# Patient Record
Sex: MALE | Race: WHITE | HISPANIC OR LATINO | URBAN - METROPOLITAN AREA
[De-identification: names, ages, dates, MRNs, and addresses within clinical notes are randomized per-mention and may not be internally consistent; named-entity substitution may affect disease eponyms.]

---

## 2018-12-26 ENCOUNTER — EMERGENCY (EMERGENCY)
Facility: HOSPITAL | Age: 23
LOS: 1 days | Discharge: HOME | End: 2018-12-26
Attending: EMERGENCY MEDICINE
Payer: SELF-PAY

## 2018-12-26 VITALS
SYSTOLIC BLOOD PRESSURE: 146 MMHG | TEMPERATURE: 98 F | DIASTOLIC BLOOD PRESSURE: 81 MMHG | HEART RATE: 109 BPM | RESPIRATION RATE: 18 BRPM | OXYGEN SATURATION: 97 %

## 2018-12-26 VITALS — WEIGHT: 199.96 LBS | HEIGHT: 64 IN

## 2018-12-26 PROCEDURE — 99283 EMERGENCY DEPT VISIT LOW MDM: CPT

## 2018-12-26 RX ORDER — OFLOXACIN 0.3 %
1 DROPS OPHTHALMIC (EYE) ONCE
Qty: 0 | Refills: 0 | Status: DISCONTINUED | OUTPATIENT
Start: 2018-12-26 | End: 2018-12-31

## 2018-12-26 RX ORDER — PROPARACAINE HYDROCHLORIDE 5 MG/ML
1 SOLUTION/ DROPS OPHTHALMIC ONCE
Qty: 0 | Refills: 0 | Status: DISCONTINUED | OUTPATIENT
Start: 2018-12-26 | End: 2018-12-26
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
## 2018-12-26 NOTE — ED STATDOCS - ATTENDING CONTRIBUTION TO CARE
I, Shaniqua Norton, performed the initial face to face bedside interview with this patient regarding history of present illness, review of symptoms and relevant past medical, social and family history.  I completed an independent physical examination.  I was the initial provider who evaluated this patient. I have signed out the follow up of any pending tests (i.e. labs, radiological studies) to the ACP.  I have communicated the patient’s plan of care and disposition with the ACP.
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
## 2020-09-29 ENCOUNTER — INPATIENT (INPATIENT)
Facility: HOSPITAL | Age: 25
LOS: 2 days | Discharge: HOME | DRG: 638 | End: 2020-10-02
Attending: STUDENT IN AN ORGANIZED HEALTH CARE EDUCATION/TRAINING PROGRAM | Admitting: STUDENT IN AN ORGANIZED HEALTH CARE EDUCATION/TRAINING PROGRAM
Payer: COMMERCIAL

## 2020-09-29 VITALS
DIASTOLIC BLOOD PRESSURE: 60 MMHG | RESPIRATION RATE: 20 BRPM | SYSTOLIC BLOOD PRESSURE: 105 MMHG | HEART RATE: 118 BPM | HEIGHT: 64 IN | OXYGEN SATURATION: 98 % | TEMPERATURE: 100 F

## 2020-09-29 DIAGNOSIS — E11.10 TYPE 2 DIABETES MELLITUS WITH KETOACIDOSIS WITHOUT COMA: ICD-10-CM

## 2020-09-29 DIAGNOSIS — Z90.49 ACQUIRED ABSENCE OF OTHER SPECIFIED PARTS OF DIGESTIVE TRACT: Chronic | ICD-10-CM

## 2020-09-29 LAB
A1C WITH ESTIMATED AVERAGE GLUCOSE RESULT: 10.7 % — HIGH (ref 4–5.6)
ALBUMIN SERPL ELPH-MCNC: 4.4 G/DL — SIGNIFICANT CHANGE UP (ref 3.3–5.2)
ALP SERPL-CCNC: 132 U/L — HIGH (ref 40–120)
ALT FLD-CCNC: 90 U/L — HIGH
ANION GAP SERPL CALC-SCNC: 21 MMOL/L — HIGH (ref 5–17)
ANION GAP SERPL CALC-SCNC: 23 MMOL/L — HIGH (ref 5–17)
APPEARANCE UR: CLEAR — SIGNIFICANT CHANGE UP
APTT BLD: 31.2 SEC — SIGNIFICANT CHANGE UP (ref 27.5–35.5)
AST SERPL-CCNC: 54 U/L — HIGH
BACTERIA # UR AUTO: ABNORMAL
BASE EXCESS BLDV CALC-SCNC: -0.3 MMOL/L — SIGNIFICANT CHANGE UP (ref -2–2)
BASOPHILS # BLD AUTO: 0.05 K/UL — SIGNIFICANT CHANGE UP (ref 0–0.2)
BASOPHILS NFR BLD AUTO: 0.7 % — SIGNIFICANT CHANGE UP (ref 0–2)
BILIRUB SERPL-MCNC: 0.5 MG/DL — SIGNIFICANT CHANGE UP (ref 0.4–2)
BILIRUB UR-MCNC: NEGATIVE — SIGNIFICANT CHANGE UP
BLD GP AB SCN SERPL QL: SIGNIFICANT CHANGE UP
BUN SERPL-MCNC: 10 MG/DL — SIGNIFICANT CHANGE UP (ref 8–20)
BUN SERPL-MCNC: 12 MG/DL — SIGNIFICANT CHANGE UP (ref 8–20)
CA-I SERPL-SCNC: 1.15 MMOL/L — SIGNIFICANT CHANGE UP (ref 1.15–1.33)
CALCIUM SERPL-MCNC: 9.5 MG/DL — SIGNIFICANT CHANGE UP (ref 8.6–10.2)
CALCIUM SERPL-MCNC: 9.7 MG/DL — SIGNIFICANT CHANGE UP (ref 8.6–10.2)
CHLORIDE BLDV-SCNC: 104 MMOL/L — SIGNIFICANT CHANGE UP (ref 98–107)
CHLORIDE SERPL-SCNC: 100 MMOL/L — SIGNIFICANT CHANGE UP (ref 98–107)
CHLORIDE SERPL-SCNC: 90 MMOL/L — LOW (ref 98–107)
CO2 SERPL-SCNC: 19 MMOL/L — LOW (ref 22–29)
CO2 SERPL-SCNC: 20 MMOL/L — LOW (ref 22–29)
COLOR SPEC: YELLOW — SIGNIFICANT CHANGE UP
CREAT SERPL-MCNC: 0.83 MG/DL — SIGNIFICANT CHANGE UP (ref 0.5–1.3)
CREAT SERPL-MCNC: 0.9 MG/DL — SIGNIFICANT CHANGE UP (ref 0.5–1.3)
DIFF PNL FLD: NEGATIVE — SIGNIFICANT CHANGE UP
EOSINOPHIL # BLD AUTO: 0.04 K/UL — SIGNIFICANT CHANGE UP (ref 0–0.5)
EOSINOPHIL NFR BLD AUTO: 0.6 % — SIGNIFICANT CHANGE UP (ref 0–6)
EPI CELLS # UR: SIGNIFICANT CHANGE UP
ESTIMATED AVERAGE GLUCOSE: 260 MG/DL — HIGH (ref 68–114)
GAS PNL BLDV: 141 MMOL/L — SIGNIFICANT CHANGE UP (ref 135–145)
GAS PNL BLDV: SIGNIFICANT CHANGE UP
GAS PNL BLDV: SIGNIFICANT CHANGE UP
GLUCOSE BLDC GLUCOMTR-MCNC: 287 MG/DL — HIGH (ref 70–99)
GLUCOSE BLDV-MCNC: 336 MG/DL — HIGH (ref 70–99)
GLUCOSE SERPL-MCNC: 355 MG/DL — HIGH (ref 70–99)
GLUCOSE SERPL-MCNC: 632 MG/DL — CRITICAL HIGH (ref 70–99)
GLUCOSE UR QL: 1000 MG/DL
HCO3 BLDV-SCNC: 24 MMOL/L — SIGNIFICANT CHANGE UP (ref 21–29)
HCT VFR BLD CALC: 48.8 % — SIGNIFICANT CHANGE UP (ref 39–50)
HCT VFR BLDA CALC: 50 — SIGNIFICANT CHANGE UP (ref 39–50)
HGB BLD CALC-MCNC: 16.3 G/DL — SIGNIFICANT CHANGE UP (ref 13–17)
HGB BLD-MCNC: 16.9 G/DL — SIGNIFICANT CHANGE UP (ref 13–17)
IMM GRANULOCYTES NFR BLD AUTO: 0.4 % — SIGNIFICANT CHANGE UP (ref 0–1.5)
INR BLD: 1 RATIO — SIGNIFICANT CHANGE UP (ref 0.88–1.16)
KETONES UR-MCNC: ABNORMAL
LACTATE BLDV-MCNC: 1.3 MMOL/L — SIGNIFICANT CHANGE UP (ref 0.5–2)
LEUKOCYTE ESTERASE UR-ACNC: NEGATIVE — SIGNIFICANT CHANGE UP
LIDOCAIN IGE QN: 35 U/L — SIGNIFICANT CHANGE UP (ref 22–51)
LYMPHOCYTES # BLD AUTO: 2.67 K/UL — SIGNIFICANT CHANGE UP (ref 1–3.3)
LYMPHOCYTES # BLD AUTO: 37.2 % — SIGNIFICANT CHANGE UP (ref 13–44)
MCHC RBC-ENTMCNC: 28.7 PG — SIGNIFICANT CHANGE UP (ref 27–34)
MCHC RBC-ENTMCNC: 34.6 GM/DL — SIGNIFICANT CHANGE UP (ref 32–36)
MCV RBC AUTO: 83 FL — SIGNIFICANT CHANGE UP (ref 80–100)
MONOCYTES # BLD AUTO: 0.44 K/UL — SIGNIFICANT CHANGE UP (ref 0–0.9)
MONOCYTES NFR BLD AUTO: 6.1 % — SIGNIFICANT CHANGE UP (ref 2–14)
NEUTROPHILS # BLD AUTO: 3.95 K/UL — SIGNIFICANT CHANGE UP (ref 1.8–7.4)
NEUTROPHILS NFR BLD AUTO: 55 % — SIGNIFICANT CHANGE UP (ref 43–77)
NITRITE UR-MCNC: NEGATIVE — SIGNIFICANT CHANGE UP
OTHER CELLS CSF MANUAL: 22 ML/DL — SIGNIFICANT CHANGE UP (ref 18–22)
PCO2 BLDV: 43 MMHG — SIGNIFICANT CHANGE UP (ref 35–50)
PH BLDV: 7.37 — SIGNIFICANT CHANGE UP (ref 7.32–7.43)
PH UR: 5 — SIGNIFICANT CHANGE UP (ref 5–8)
PLATELET # BLD AUTO: 255 K/UL — SIGNIFICANT CHANGE UP (ref 150–400)
PO2 BLDV: 87 MMHG — HIGH (ref 25–45)
POTASSIUM BLDV-SCNC: 3.7 MMOL/L — SIGNIFICANT CHANGE UP (ref 3.4–4.5)
POTASSIUM SERPL-MCNC: 3.7 MMOL/L — SIGNIFICANT CHANGE UP (ref 3.5–5.3)
POTASSIUM SERPL-MCNC: 3.8 MMOL/L — SIGNIFICANT CHANGE UP (ref 3.5–5.3)
POTASSIUM SERPL-SCNC: 3.7 MMOL/L — SIGNIFICANT CHANGE UP (ref 3.5–5.3)
POTASSIUM SERPL-SCNC: 3.8 MMOL/L — SIGNIFICANT CHANGE UP (ref 3.5–5.3)
PROT SERPL-MCNC: 8 G/DL — SIGNIFICANT CHANGE UP (ref 6.6–8.7)
PROT UR-MCNC: 15 MG/DL
PROTHROM AB SERPL-ACNC: 11.6 SEC — SIGNIFICANT CHANGE UP (ref 10.6–13.6)
RBC # BLD: 5.88 M/UL — HIGH (ref 4.2–5.8)
RBC # FLD: 11.8 % — SIGNIFICANT CHANGE UP (ref 10.3–14.5)
RBC CASTS # UR COMP ASSIST: NEGATIVE /HPF — SIGNIFICANT CHANGE UP (ref 0–4)
SAO2 % BLDV: 98 % — SIGNIFICANT CHANGE UP
SARS-COV-2 RNA SPEC QL NAA+PROBE: SIGNIFICANT CHANGE UP
SODIUM SERPL-SCNC: 133 MMOL/L — LOW (ref 135–145)
SODIUM SERPL-SCNC: 140 MMOL/L — SIGNIFICANT CHANGE UP (ref 135–145)
SP GR SPEC: 1.01 — SIGNIFICANT CHANGE UP (ref 1.01–1.02)
TSH SERPL-MCNC: 2.19 UIU/ML — SIGNIFICANT CHANGE UP (ref 0.27–4.2)
UROBILINOGEN FLD QL: NEGATIVE MG/DL — SIGNIFICANT CHANGE UP
WBC # BLD: 7.18 K/UL — SIGNIFICANT CHANGE UP (ref 3.8–10.5)
WBC # FLD AUTO: 7.18 K/UL — SIGNIFICANT CHANGE UP (ref 3.8–10.5)
WBC UR QL: SIGNIFICANT CHANGE UP

## 2020-09-29 PROCEDURE — 99291 CRITICAL CARE FIRST HOUR: CPT

## 2020-09-29 PROCEDURE — 99223 1ST HOSP IP/OBS HIGH 75: CPT

## 2020-09-29 RX ORDER — SODIUM CHLORIDE 9 MG/ML
1000 INJECTION, SOLUTION INTRAVENOUS ONCE
Refills: 0 | Status: COMPLETED | OUTPATIENT
Start: 2020-09-29 | End: 2020-09-29

## 2020-09-29 RX ORDER — INSULIN LISPRO 100/ML
10 VIAL (ML) SUBCUTANEOUS ONCE
Refills: 0 | Status: COMPLETED | OUTPATIENT
Start: 2020-09-29 | End: 2020-09-29

## 2020-09-29 RX ORDER — GLUCAGON INJECTION, SOLUTION 0.5 MG/.1ML
1 INJECTION, SOLUTION SUBCUTANEOUS ONCE
Refills: 0 | Status: DISCONTINUED | OUTPATIENT
Start: 2020-09-29 | End: 2020-10-02

## 2020-09-29 RX ORDER — KETOROLAC TROMETHAMINE 30 MG/ML
15 SYRINGE (ML) INJECTION ONCE
Refills: 0 | Status: DISCONTINUED | OUTPATIENT
Start: 2020-09-29 | End: 2020-09-29

## 2020-09-29 RX ORDER — POTASSIUM CHLORIDE 20 MEQ
10 PACKET (EA) ORAL ONCE
Refills: 0 | Status: DISCONTINUED | OUTPATIENT
Start: 2020-09-29 | End: 2020-09-29

## 2020-09-29 RX ORDER — INFLUENZA VIRUS VACCINE 15; 15; 15; 15 UG/.5ML; UG/.5ML; UG/.5ML; UG/.5ML
0.5 SUSPENSION INTRAMUSCULAR ONCE
Refills: 0 | Status: DISCONTINUED | OUTPATIENT
Start: 2020-09-29 | End: 2020-10-02

## 2020-09-29 RX ORDER — ONDANSETRON 8 MG/1
4 TABLET, FILM COATED ORAL ONCE
Refills: 0 | Status: COMPLETED | OUTPATIENT
Start: 2020-09-29 | End: 2020-09-29

## 2020-09-29 RX ORDER — INSULIN LISPRO 100/ML
12 VIAL (ML) SUBCUTANEOUS ONCE
Refills: 0 | Status: COMPLETED | OUTPATIENT
Start: 2020-09-29 | End: 2020-09-29

## 2020-09-29 RX ORDER — ONDANSETRON 8 MG/1
4 TABLET, FILM COATED ORAL EVERY 6 HOURS
Refills: 0 | Status: DISCONTINUED | OUTPATIENT
Start: 2020-09-29 | End: 2020-10-02

## 2020-09-29 RX ORDER — SODIUM CHLORIDE 9 MG/ML
1000 INJECTION, SOLUTION INTRAVENOUS
Refills: 0 | Status: DISCONTINUED | OUTPATIENT
Start: 2020-09-29 | End: 2020-10-02

## 2020-09-29 RX ORDER — DEXTROSE 50 % IN WATER 50 %
25 SYRINGE (ML) INTRAVENOUS ONCE
Refills: 0 | Status: DISCONTINUED | OUTPATIENT
Start: 2020-09-29 | End: 2020-10-02

## 2020-09-29 RX ORDER — INSULIN GLARGINE 100 [IU]/ML
30 INJECTION, SOLUTION SUBCUTANEOUS AT BEDTIME
Refills: 0 | Status: DISCONTINUED | OUTPATIENT
Start: 2020-09-30 | End: 2020-10-01

## 2020-09-29 RX ORDER — ATORVASTATIN CALCIUM 80 MG/1
20 TABLET, FILM COATED ORAL AT BEDTIME
Refills: 0 | Status: DISCONTINUED | OUTPATIENT
Start: 2020-09-29 | End: 2020-10-02

## 2020-09-29 RX ORDER — SODIUM CHLORIDE 9 MG/ML
1000 INJECTION, SOLUTION INTRAVENOUS
Refills: 0 | Status: DISCONTINUED | OUTPATIENT
Start: 2020-09-29 | End: 2020-09-29

## 2020-09-29 RX ORDER — DEXTROSE 50 % IN WATER 50 %
15 SYRINGE (ML) INTRAVENOUS ONCE
Refills: 0 | Status: DISCONTINUED | OUTPATIENT
Start: 2020-09-29 | End: 2020-10-02

## 2020-09-29 RX ORDER — INSULIN GLARGINE 100 [IU]/ML
30 INJECTION, SOLUTION SUBCUTANEOUS ONCE
Refills: 0 | Status: COMPLETED | OUTPATIENT
Start: 2020-09-29 | End: 2020-09-29

## 2020-09-29 RX ORDER — DEXTROSE 50 % IN WATER 50 %
12.5 SYRINGE (ML) INTRAVENOUS ONCE
Refills: 0 | Status: DISCONTINUED | OUTPATIENT
Start: 2020-09-29 | End: 2020-10-02

## 2020-09-29 RX ORDER — INSULIN LISPRO 100/ML
VIAL (ML) SUBCUTANEOUS
Refills: 0 | Status: DISCONTINUED | OUTPATIENT
Start: 2020-09-29 | End: 2020-10-02

## 2020-09-29 RX ORDER — POTASSIUM CHLORIDE 20 MEQ
10 PACKET (EA) ORAL ONCE
Refills: 0 | Status: COMPLETED | OUTPATIENT
Start: 2020-09-29 | End: 2020-09-29

## 2020-09-29 RX ADMIN — SODIUM CHLORIDE 1000 MILLILITER(S): 9 INJECTION, SOLUTION INTRAVENOUS at 13:14

## 2020-09-29 RX ADMIN — Medication 10 UNIT(S): at 14:37

## 2020-09-29 RX ADMIN — ONDANSETRON 4 MILLIGRAM(S): 8 TABLET, FILM COATED ORAL at 13:16

## 2020-09-29 RX ADMIN — Medication 15 MILLIGRAM(S): at 13:14

## 2020-09-29 RX ADMIN — Medication 100 MILLIEQUIVALENT(S): at 15:32

## 2020-09-29 RX ADMIN — SODIUM CHLORIDE 1000 MILLILITER(S): 9 INJECTION, SOLUTION INTRAVENOUS at 14:16

## 2020-09-29 RX ADMIN — INSULIN GLARGINE 30 UNIT(S): 100 INJECTION, SOLUTION SUBCUTANEOUS at 15:32

## 2020-09-29 RX ADMIN — Medication 15 MILLIGRAM(S): at 15:38

## 2020-09-29 RX ADMIN — SODIUM CHLORIDE 100 MILLILITER(S): 9 INJECTION, SOLUTION INTRAVENOUS at 22:56

## 2020-09-29 RX ADMIN — SODIUM CHLORIDE 1000 MILLILITER(S): 9 INJECTION, SOLUTION INTRAVENOUS at 17:03

## 2020-09-29 RX ADMIN — Medication 12 UNIT(S): at 13:39

## 2020-09-29 RX ADMIN — SODIUM CHLORIDE 200 MILLILITER(S): 9 INJECTION, SOLUTION INTRAVENOUS at 20:15

## 2020-09-29 RX ADMIN — ATORVASTATIN CALCIUM 20 MILLIGRAM(S): 80 TABLET, FILM COATED ORAL at 22:55

## 2020-09-29 NOTE — H&P ADULT - ATTENDING COMMENTS
Pt interview and examined with his girlfriend at bedside. My addendum with physical exam as documented below.     25yoM hx obesity, HLD, strong FHx of DM being admitted for new onset DM with hyperglycemia and mild DKA.  Pt reports a 1 week hx of urinary frequency and urgency, polydipsia that eventually progressed to generalized abdominal pain associated with nausea and vomiting for the past 3 days.  Pt has a PMD and reports he was told that he was ‘pre-diabetic’ about 2 months ago and was told to check his finger sticks but says he never received the lancets and strips that he was prescribed.  On admission, initial FS >530 and serum glucose 632, AG 23, pH on VBG 7.32. UA showed moderate ketones and glucosuria.  In ED, pt received 3L of lactated ringers, Humalog 10U, Humalog 12 and lantus 30U. Repeat BMP showed improvement in glucose (385) and AGMA (21).  Pt reports resolution of GI symptoms after ED interventions.    New onset of DM with hyperglycemia and mild DKA  -Uncontrolled DM, HgbA1c 10.7, hyperglycemia, GI-related symptoms with AGMA and moderate ketonuria   -Received total of 52U insulin in ED (Humalog 10U, Humalog 12U, lantus 30U)  -As insulin naïve, will monitor FS overnight, scheduled FS timed for 9PM, 1AM, 6AM  -Based on 0.2units/kg dosing, will start lantus 30U  -Day time ISS started by tele-hospitalist, will add nocturnal ISS  -Endocrine consulted  -Explained to pt that he will likely require insulin and diabetes teaching at time of discharge     Anion gap metabolic acidosis  -Due to hyperglycemia, VBG shows normal pH  -Receiving insulin and IVF with some improvement  -Trend BMP    HLD  -On atorvastatin but doesn’t remember dose,   -Called [facility] pharmacy overnight, unable to reach pharmacist     Prophylactic measure  -Low risk, Intermittent pneumatic compressions    GENERAL:  Well-appearing obese male, not in acute distress  EYES:  Clear conjunctiva, extraocular movement intact  ENT: Moist mucous membranes  RESP:  Non-labored breathing pattern, lungs clear to ausculation   CV: Regular rate and rhythm, no murmurs appreciated, no lower extremity edema  GI: Soft, non-tender, non-distended  NEURO: Awake, alert, conversant, upper and lower extremity strength 5/5, light touch sensation grossly intact  PSYCH: Calm, cooperative  SKIN: No rash or lesions, warm and dry Pt interview and examined with his girlfriend at bedside. My addendum with physical exam as documented below.     25yoM hx obesity, HLD, strong FHx of DM being admitted for new onset DM with hyperglycemia and mild DKA.  Pt reports a 1 week hx of urinary frequency and urgency, polydipsia that eventually progressed to generalized abdominal pain associated with nausea and vomiting for the past 3 days.  Pt has a PMD and reports he was told that he was ‘pre-diabetic’ about 2 months ago and was told to check his finger sticks but says he never received the lancets and strips that he was prescribed.  On admission, initial FS >530 and serum glucose 632, AG 23, pH on VBG 7.32. UA showed moderate ketones and glucosuria.  In ED, pt received 3L of lactated ringers, Humalog 10U, Humalog 12 and lantus 30U. Repeat BMP showed improvement in glucose (385) and AGMA (21).  Pt reports resolution of GI symptoms after ED interventions.    New onset of DM with hyperglycemia and mild DKA  -Uncontrolled DM, HgbA1c 10.7, hyperglycemia, GI-related symptoms with AGMA and moderate ketonuria   -Received total of 52U insulin in ED (Humalog 10U, Humalog 12U, lantus 30U)  -As insulin naïve, will monitor FS overnight, scheduled FS timed for 9PM, 1AM, 6AM  -Based on 0.2units/kg dosing, will start lantus 30U  -Day time ISS started by tele-hospitalist, will add nocturnal ISS  -Endocrine consulted  -Explained to pt that he will likely require insulin and diabetes teaching at time of discharge     Anion gap metabolic acidosis  -Due to hyperglycemia, VBG shows normal pH  -Receiving insulin and IVF with some improvement  -Trend BMP    HLD  -On atorvastatin but doesn’t remember dose  -Atorvastatin 20mg started by tele-hospitalist   -Called [facility] pharmacy overnight, unable to reach a pharmacist, please call again in AM    Prophylactic measure  -Low risk, Intermittent pneumatic compressions    GENERAL:  Well-appearing obese male, not in acute distress  EYES:  Clear conjunctiva, extraocular movement intact  ENT: Moist mucous membranes  RESP:  Non-labored breathing pattern, lungs clear to ausculation   CV: Regular rate and rhythm, no murmurs appreciated, no lower extremity edema  GI: Soft, non-tender, non-distended  NEURO: Awake, alert, conversant, upper and lower extremity strength 5/5, light touch sensation grossly intact  PSYCH: Calm, cooperative  SKIN: No rash or lesions, warm and dry Pt interview and examined with his girlfriend at bedside. My addendum with physical exam as documented below.     25yoM hx obesity, HLD, strong FHx of DM being admitted for new onset DM with hyperglycemia and mild DKA.  Pt reports a 1 week hx of urinary frequency and urgency, polydipsia that eventually progressed to generalized abdominal pain associated with nausea and vomiting for the past 3 days.  Pt has a PMD and reports he was told that he was ‘pre-diabetic’ about 2 months ago and was told to check his finger sticks but says he never received the lancets and strips that he was prescribed.  On admission, initial FS >530 and serum glucose 632, AG 23, pH on VBG 7.32. UA showed moderate ketones and glucosuria.  In ED, pt received 3L of lactated ringers, Humalog 10U, Humalog 12 and lantus 30U. Repeat BMP showed improvement in glucose (385) and AGMA (21).  Pt reports resolution of GI symptoms after ED interventions.    #New onset of DM with hyperglycemia and mild DKA  -Uncontrolled DM, HgbA1c 10.7, hyperglycemia, GI-related symptoms with AGMA and moderate ketonuria   -Received total of 52U insulin in ED (Humalog 10U, Humalog 12U, lantus 30U)  -As insulin naïve, will monitor FS overnight, scheduled FS timed for 9PM, 1AM, 6AM  -Based on 0.2units/kg dosing, will start lantus 30U  -Day time ISS started by tele-hospitalist, will add nocturnal ISS  -Endocrine consulted  -Explained to pt that he will likely require insulin and diabetes teaching at time of discharge     #Anion gap metabolic acidosis  -Due to hyperglycemia, VBG shows normal pH  -Receiving insulin and IVF with some improvement  -Trend BMP    #HLD  -On atorvastatin but doesn’t remember dose  -Atorvastatin 20mg started by tele-hospitalist   -Called [facility] pharmacy overnight, unable to reach a pharmacist, please call again in AM    #Prophylactic measure  -Low risk, Intermittent pneumatic compressions    GENERAL:  Well-appearing obese male, not in acute distress  EYES:  Clear conjunctiva, extraocular movement intact  ENT: Moist mucous membranes  RESP:  Non-labored breathing pattern, lungs clear to ausculation   CV: Regular rate and rhythm, no murmurs appreciated, no lower extremity edema  GI: Soft, non-tender, non-distended  NEURO: Awake, alert, conversant, upper and lower extremity strength 5/5, light touch sensation grossly intact  PSYCH: Calm, cooperative  SKIN: No rash or lesions, warm and dry

## 2020-09-29 NOTE — ED PROVIDER NOTE - NS ED ROS FT
ROS: CONTUSIONAL: Denies fever, chills, fatigue, wt loss. HEAD: Denies trauma, HA, Dizziness. EYE: Denies Acute visual changes, diplopia. ENMT: Denies change in hearing, tinnitus, epistaxis, difficulty swallowing, sore throat. CARDIO: Denies CP, palpitations, edema. RESP: Denies Cough, SOB , Diff breathing, hemoptysis. GI: + N/V, ABD pain, Denies  change in bowel movement. URINARY: Denies difficulty urinating, pelvic pain. MS:  Denies joint pain, back pain, weakness, decreased ROM, swelling. NEURO: Denies change in gait, seizures, loss of sensation, dizziness, confusion LOC.  PSY: NO SI/HI.

## 2020-09-29 NOTE — H&P ADULT - HISTORY OF PRESENT ILLNESS
25 year old male with known history of hyperlipidemia and obesity with recent 20 pound weight loss presents with two day history of nausea, vomiting , abdominal pain and very frequent urination. In ED found to have new onset Diabetes and acidosis. Patient states he is feeling better since getting hydration in the ED and nausea and abdominal pain have since resolved and he wants to eat

## 2020-09-29 NOTE — H&P ADULT - ASSESSMENT
25 year old with new onset DM- - received three units of ringer lactate with slight improvement in AG ,   VBG PH =7.37- Will continue IVF with ringer lactate , Lantus and FS with coverage ordered .  Patient now tolerating Oral diet- will start consistent carb diet and monitor labs, Hgba1c and TSH   Diabetic teaching ordered  and please call Endocrinologist consult in AM   Hyperlipidemia- Statin to cont   DVT PPX- low risk ambulating

## 2020-09-29 NOTE — ED ADULT NURSE NOTE - OBJECTIVE STATEMENT
Pt A&Ox4. Pt complaining of frequent urination x1 week as well as excessive thirst.  PT stated that he has been nauseous and vomiting x2days.  Pt has hx of sleep apnea as well as HTN. No complaints of SOB, denies fever/chill/  Will continue to monitor

## 2020-09-29 NOTE — ED ADULT TRIAGE NOTE - CHIEF COMPLAINT QUOTE
Pt reports N/V x3 days with decreased appetite and headache, pt also reports being started on atorvastatin 1 month ago. Pt presents to ED tachycardic and febrile.

## 2020-09-29 NOTE — ED PROVIDER NOTE - ATTENDING CONTRIBUTION TO CARE
25 YOM pmh HLD here for 2 days of generalized abd pain and polyuria and urinary frequency. Reports has been told he was pre-diabetic in the past but not on meds currently. Denies f/c, vomiting, cp, sob, back pain, diarrhea.  AP - FSG >500. very well appearing. will get labs r/o DKA. fluids/insulin. reassess 25 YOM pmh HLD here for 2 days of generalized abd pain and polyuria and urinary frequency. Reports has been told he was pre-diabetic in the past but not on meds currently. Denies f/c, vomiting, cp, sob, back pain, diarrhea.  AP - FSG >500. very well appearing. will get labs r/o DKA. fluids/insulin. anticipate admission

## 2020-09-29 NOTE — ED ADULT NURSE NOTE - NSIMPLEMENTINTERV_GEN_ALL_ED
Implemented All Universal Safety Interventions:  [place] to call system. Call bell, personal items and telephone within reach. Instruct patient to call for assistance. Room bathroom lighting operational. Non-slip footwear when patient is off stretcher. Physically safe environment: no spills, clutter or unnecessary equipment. Stretcher in lowest position, wheels locked, appropriate side rails in place.

## 2020-09-29 NOTE — ED PROVIDER NOTE - CLINICAL SUMMARY MEDICAL DECISION MAKING FREE TEXT BOX
PT with new onset DM, found to be in DKA, anion gap improved, Pt not acidotic, Pt discussed with hospitalist, that wants to have pt admitted for further evaluation and treatment, pt made aware of need for admission and possible further treatments, pt states that they agree with need for admission and they understand all results and possible treatments. PT will be admitted to hospitalist team and care will be transferred to admitting team.

## 2020-09-30 PROBLEM — E78.5 HYPERLIPIDEMIA, UNSPECIFIED: Chronic | Status: ACTIVE | Noted: 2020-09-29

## 2020-09-30 LAB
A1C WITH ESTIMATED AVERAGE GLUCOSE RESULT: 10 % — HIGH (ref 4–5.6)
ALBUMIN SERPL ELPH-MCNC: 3.5 G/DL — SIGNIFICANT CHANGE UP (ref 3.3–5.2)
ALP SERPL-CCNC: 102 U/L — SIGNIFICANT CHANGE UP (ref 40–120)
ALT FLD-CCNC: 87 U/L — HIGH
ANION GAP SERPL CALC-SCNC: 15 MMOL/L — SIGNIFICANT CHANGE UP (ref 5–17)
AST SERPL-CCNC: 111 U/L — HIGH
B-OH-BUTYR SERPL-SCNC: 3.3 MMOL/L — HIGH
BILIRUB SERPL-MCNC: 0.5 MG/DL — SIGNIFICANT CHANGE UP (ref 0.4–2)
BUN SERPL-MCNC: 10 MG/DL — SIGNIFICANT CHANGE UP (ref 8–20)
CALCIUM SERPL-MCNC: 8.7 MG/DL — SIGNIFICANT CHANGE UP (ref 8.6–10.2)
CHLORIDE SERPL-SCNC: 99 MMOL/L — SIGNIFICANT CHANGE UP (ref 98–107)
CHOLEST SERPL-MCNC: 224 MG/DL — HIGH (ref 110–199)
CO2 SERPL-SCNC: 23 MMOL/L — SIGNIFICANT CHANGE UP (ref 22–29)
CREAT SERPL-MCNC: 0.78 MG/DL — SIGNIFICANT CHANGE UP (ref 0.5–1.3)
CULTURE RESULTS: NO GROWTH — SIGNIFICANT CHANGE UP
ESTIMATED AVERAGE GLUCOSE: 240 MG/DL — HIGH (ref 68–114)
GLUCOSE BLDC GLUCOMTR-MCNC: 285 MG/DL — HIGH (ref 70–99)
GLUCOSE BLDC GLUCOMTR-MCNC: 296 MG/DL — HIGH (ref 70–99)
GLUCOSE BLDC GLUCOMTR-MCNC: 321 MG/DL — HIGH (ref 70–99)
GLUCOSE BLDC GLUCOMTR-MCNC: 335 MG/DL — HIGH (ref 70–99)
GLUCOSE BLDC GLUCOMTR-MCNC: 464 MG/DL — CRITICAL HIGH (ref 70–99)
GLUCOSE BLDC GLUCOMTR-MCNC: 473 MG/DL — CRITICAL HIGH (ref 70–99)
GLUCOSE SERPL-MCNC: 334 MG/DL — HIGH (ref 70–99)
HDLC SERPL-MCNC: 22 MG/DL — LOW
LIPID PNL WITH DIRECT LDL SERPL: SIGNIFICANT CHANGE UP MG/DL
POTASSIUM SERPL-MCNC: 3.9 MMOL/L — SIGNIFICANT CHANGE UP (ref 3.5–5.3)
POTASSIUM SERPL-SCNC: 3.9 MMOL/L — SIGNIFICANT CHANGE UP (ref 3.5–5.3)
PROT SERPL-MCNC: 6.6 G/DL — SIGNIFICANT CHANGE UP (ref 6.6–8.7)
SODIUM SERPL-SCNC: 137 MMOL/L — SIGNIFICANT CHANGE UP (ref 135–145)
SPECIMEN SOURCE: SIGNIFICANT CHANGE UP
TOTAL CHOLESTEROL/HDL RATIO MEASUREMENT: 10 RATIO — HIGH (ref 3.4–9.6)
TRIGL SERPL-MCNC: 573 MG/DL — HIGH (ref 10–200)

## 2020-09-30 PROCEDURE — 99232 SBSQ HOSP IP/OBS MODERATE 35: CPT

## 2020-09-30 PROCEDURE — 99222 1ST HOSP IP/OBS MODERATE 55: CPT

## 2020-09-30 RX ORDER — INSULIN LISPRO 100/ML
10 VIAL (ML) SUBCUTANEOUS ONCE
Refills: 0 | Status: COMPLETED | OUTPATIENT
Start: 2020-09-30 | End: 2020-09-30

## 2020-09-30 RX ORDER — INSULIN LISPRO 100/ML
VIAL (ML) SUBCUTANEOUS AT BEDTIME
Refills: 0 | Status: DISCONTINUED | OUTPATIENT
Start: 2020-09-30 | End: 2020-10-02

## 2020-09-30 RX ADMIN — Medication 6: at 16:43

## 2020-09-30 RX ADMIN — INSULIN GLARGINE 30 UNIT(S): 100 INJECTION, SOLUTION SUBCUTANEOUS at 21:33

## 2020-09-30 RX ADMIN — Medication 6: at 12:02

## 2020-09-30 RX ADMIN — Medication 8: at 05:55

## 2020-09-30 RX ADMIN — ATORVASTATIN CALCIUM 20 MILLIGRAM(S): 80 TABLET, FILM COATED ORAL at 21:33

## 2020-09-30 RX ADMIN — Medication 10 UNIT(S): at 01:41

## 2020-09-30 RX ADMIN — Medication 2: at 21:33

## 2020-09-30 NOTE — ADVANCED PRACTICE NURSE CONSULT - RECOMMEDATIONS
continue diabetes self management education  pt to inject all inuslin doses while in the hospital using prefilled inuslin syringe and rn supervision.  glucometer teaching

## 2020-09-30 NOTE — CONSULT NOTE ADULT - SUBJECTIVE AND OBJECTIVE BOX
Patient is a 25y old  Male who presents with a chief complaint of New onset Diabetes (29 Sep 2020 18:43)    HPI:  25M with known history of hyperlipidemia and obesity with recent 20 pound weight loss presents with two day history of nausea, vomiting , abdominal pain and very frequent urination. In ED found to have new onset Diabetes and acidosis. Patient states he is feeling better since getting hydration in the ED and nausea and abdominal pain have since resolved and he wants to eat  (29 Sep 2020 18:43)      PAST MEDICAL & SURGICAL HISTORY:  HLD (hyperlipidemia)    History of appendectomy        Social History:  lives at home independent (29 Sep 2020 18:43)      FAMILY HISTORY:        Allergies    No Known Allergies    Intolerances        REVIEW OF SYSTEMS:    CONSTITUTIONAL: No fever, weight loss, or fatigue  EYES: No eye pain, visual disturbances, or discharge  ENMT:  No difficulty hearing, tinnitus, vertigo; No sinus or throat pain  NECK: No pain or stiffness  RESPIRATORY: No cough, wheezing, chills or hemoptysis; No shortness of breath  CARDIOVASCULAR: No chest pain, palpitations, dizziness, or leg swelling  GASTROINTESTINAL: No abdominal or epigastric pain. No nausea, vomiting, or hematemesis; No diarrhea or constipation. No melena or hematochezia.  NEUROLOGICAL: No headaches, memory loss, loss of strength, numbness, or tremors  SKIN: No itching, burning, rashes, or lesions   MUSCULOSKELETAL: No joint pain or swelling; No muscle, back, or extremity pain  PSYCHIATRIC: No depression, anxiety, mood swings, or difficulty sleeping        MEDICATIONS  (STANDING):  atorvastatin 20 milliGRAM(s) Oral at bedtime  dextrose 5%. 1000 milliLiter(s) (50 mL/Hr) IV Continuous <Continuous>  dextrose 50% Injectable 12.5 Gram(s) IV Push once  dextrose 50% Injectable 25 Gram(s) IV Push once  dextrose 50% Injectable 25 Gram(s) IV Push once  influenza   Vaccine 0.5 milliLiter(s) IntraMuscular once  insulin glargine Injectable (LANTUS) 30 Unit(s) SubCutaneous at bedtime  insulin lispro (HumaLOG) corrective regimen sliding scale   SubCutaneous three times a day before meals  insulin lispro (HumaLOG) corrective regimen sliding scale   SubCutaneous at bedtime  lactated ringers. 1000 milliLiter(s) (100 mL/Hr) IV Continuous <Continuous>    MEDICATIONS  (PRN):  dextrose 40% Gel 15 Gram(s) Oral once PRN Blood Glucose LESS THAN 70 milliGRAM(s)/deciliter  glucagon  Injectable 1 milliGRAM(s) IntraMuscular once PRN Glucose LESS THAN 70 milligrams/deciliter  ondansetron Injectable 4 milliGRAM(s) IV Push every 6 hours PRN Nausea and/or Vomiting      Vital Signs Last 24 Hrs  T(C): 37 (30 Sep 2020 15:35), Max: 37.2 (29 Sep 2020 20:24)  T(F): 98.6 (30 Sep 2020 15:35), Max: 98.9 (29 Sep 2020 20:24)  HR: 83 (30 Sep 2020 15:35) (70 - 94)  BP: 121/84 (30 Sep 2020 15:35) (111/63 - 145/77)  BP(mean): --  RR: 18 (30 Sep 2020 15:35) (18 - 20)  SpO2: 95% (30 Sep 2020 15:35) (95% - 99%)    Physical Exam:    Constitutional: NAD, well-developed  HEENT: EOMI, no exophalmos  Neck: trachea midline, no thyroid enlargement  Respiratory: CTAB, normal respirations  Cardiovascular: S1 and S2, RRR  Gastrointestinal: BS+, soft, ntnd  Extremities: No peripheral edema  Neurological: AOx3, no focal deficits  Psychiatric: Normal mood and normal affect  Skin: no rashes, no acanthosis    LABS  09-30    137  |  99  |  10.0  ----------------------------<  334<H>  3.9   |  23.0  |  0.78    Ca    8.7      30 Sep 2020 07:36    TPro  6.6  /  Alb  3.5  /  TBili  0.5  /  DBili  x   /  AST  111<H>  /  ALT  87<H>  /  AlkPhos  102  09-30                          16.9   7.18  )-----------( 255      ( 29 Sep 2020 13:22 )             48.8       A1C with Estimated Average Glucose Result: 10.0 % (09-30-20 @ 07:36)  A1C with Estimated Average Glucose Result: 10.7 % (09-29-20 @ 13:22)        Ketone - Urine: Moderate (09-29 @ 13:27)    Alkaline Phosphatase, Serum: 102 U/L (09-30-20 @ 07:36)  Aspartate Aminotransferase (AST/SGOT): 111 U/L (09-30-20 @ 07:36)  Alanine Aminotransferase (ALT/SGPT): 87 U/L (09-30-20 @ 07:36)  Albumin, Serum: 3.5 g/dL (09-30-20 @ 07:36)  Alkaline Phosphatase, Serum: 132 U/L (09-29-20 @ 13:22)  Alanine Aminotransferase (ALT/SGPT): 90 U/L (09-29-20 @ 13:22)  Albumin, Serum: 4.4 g/dL (09-29-20 @ 13:22)  Aspartate Aminotransferase (AST/SGOT): 54 U/L (09-29-20 @ 13:22)    Thyroid Stimulating Hormone, Serum: 2.19 uIU/mL (09-29-20 @ 16:50)    CAPILLARY BLOOD GLUCOSE      POCT Blood Glucose.: 285 mg/dL (30 Sep 2020 11:24)  POCT Blood Glucose.: 335 mg/dL (30 Sep 2020 05:51)  POCT Blood Glucose.: 473 mg/dL (30 Sep 2020 00:55)  POCT Blood Glucose.: 464 mg/dL (30 Sep 2020 00:54)  POCT Blood Glucose.: 287 mg/dL (29 Sep 2020 20:57)     Patient is a 25y old  Male who presents with a chief complaint of New onset Diabetes (29 Sep 2020 18:43)    HPI:  25M with recent dx of "borderline diabetes," hyperlipidemia and obesity presents with1 week history of polyuria/polydipsia and 2 day history of worsening nausea, vomiting, abdominal pain. Reported intentional weight loss of 20 lbs due to changes in diet (stopped soda/bread). Was told be by PMD that he had borderline diabetes but was not started any type of meds for diabetes and only started on lipitor. In ED found to have new onset Diabetes and acidosis. Patient states he is feeling better since getting hydration in the ED and nausea and abdominal pain have since resolved and he wants to eat. A1c 10.7  fh of diabetes in mother's side- mother, grandmother, great grandmother, aunts  lives with family at home   denies smoking/ivdu/etoh, [place]  does not know how to check sugars    PAST MEDICAL & SURGICAL HISTORY:  HLD (hyperlipidemia)    History of appendectomy        Social History:  lives at home independent (29 Sep 2020 18:43)      FAMILY HISTORY:  see above      Allergies    No Known Allergies    Intolerances        REVIEW OF SYSTEMS:    CONSTITUTIONAL: No fever, weight loss, or fatigue  EYES: No eye pain, visual disturbances, or discharge  ENMT:  No difficulty hearing, tinnitus, vertigo; No sinus or throat pain  NECK: No pain or stiffness  RESPIRATORY: No cough, wheezing, chills or hemoptysis; No shortness of breath  CARDIOVASCULAR: No chest pain, palpitations, dizziness, or leg swelling  GASTROINTESTINAL: No abdominal or epigastric pain. No nausea, vomiting, or hematemesis; No diarrhea or constipation. No melena or hematochezia.  NEUROLOGICAL: No headaches, memory loss, loss of strength, numbness, or tremors  SKIN: No itching, burning, rashes, or lesions   MUSCULOSKELETAL: No joint pain or swelling; No muscle, back, or extremity pain  PSYCHIATRIC: No depression, anxiety, mood swings, or difficulty sleeping        MEDICATIONS  (STANDING):  atorvastatin 20 milliGRAM(s) Oral at bedtime  dextrose 5%. 1000 milliLiter(s) (50 mL/Hr) IV Continuous <Continuous>  dextrose 50% Injectable 12.5 Gram(s) IV Push once  dextrose 50% Injectable 25 Gram(s) IV Push once  dextrose 50% Injectable 25 Gram(s) IV Push once  influenza   Vaccine 0.5 milliLiter(s) IntraMuscular once  insulin glargine Injectable (LANTUS) 30 Unit(s) SubCutaneous at bedtime  insulin lispro (HumaLOG) corrective regimen sliding scale   SubCutaneous three times a day before meals  insulin lispro (HumaLOG) corrective regimen sliding scale   SubCutaneous at bedtime  lactated ringers. 1000 milliLiter(s) (100 mL/Hr) IV Continuous <Continuous>    MEDICATIONS  (PRN):  dextrose 40% Gel 15 Gram(s) Oral once PRN Blood Glucose LESS THAN 70 milliGRAM(s)/deciliter  glucagon  Injectable 1 milliGRAM(s) IntraMuscular once PRN Glucose LESS THAN 70 milligrams/deciliter  ondansetron Injectable 4 milliGRAM(s) IV Push every 6 hours PRN Nausea and/or Vomiting      Vital Signs Last 24 Hrs  T(C): 37 (30 Sep 2020 15:35), Max: 37.2 (29 Sep 2020 20:24)  T(F): 98.6 (30 Sep 2020 15:35), Max: 98.9 (29 Sep 2020 20:24)  HR: 83 (30 Sep 2020 15:35) (70 - 94)  BP: 121/84 (30 Sep 2020 15:35) (111/63 - 145/77)  BP(mean): --  RR: 18 (30 Sep 2020 15:35) (18 - 20)  SpO2: 95% (30 Sep 2020 15:35) (95% - 99%)    Physical Exam:    Constitutional: NAD, well-developed, obese  HEENT: EOMI, no exophalmos  Neck: trachea midline, no thyroid enlargement  Respiratory: CTAB, normal respirations  Cardiovascular: S1 and S2, RRR  Gastrointestinal: BS+, soft, ntnd  Extremities: No peripheral edema  Neurological: AOx3, no focal deficits  Psychiatric: Normal mood and normal affect  Skin: no rashes, no acanthosis    LABS  09-30    137  |  99  |  10.0  ----------------------------<  334<H>  3.9   |  23.0  |  0.78    Ca    8.7      30 Sep 2020 07:36    TPro  6.6  /  Alb  3.5  /  TBili  0.5  /  DBili  x   /  AST  111<H>  /  ALT  87<H>  /  AlkPhos  102  09-30                          16.9   7.18  )-----------( 255      ( 29 Sep 2020 13:22 )             48.8       A1C with Estimated Average Glucose Result: 10.0 % (09-30-20 @ 07:36)  A1C with Estimated Average Glucose Result: 10.7 % (09-29-20 @ 13:22)        Ketone - Urine: Moderate (09-29 @ 13:27)    Alkaline Phosphatase, Serum: 102 U/L (09-30-20 @ 07:36)  Aspartate Aminotransferase (AST/SGOT): 111 U/L (09-30-20 @ 07:36)  Alanine Aminotransferase (ALT/SGPT): 87 U/L (09-30-20 @ 07:36)  Albumin, Serum: 3.5 g/dL (09-30-20 @ 07:36)  Alkaline Phosphatase, Serum: 132 U/L (09-29-20 @ 13:22)  Alanine Aminotransferase (ALT/SGPT): 90 U/L (09-29-20 @ 13:22)  Albumin, Serum: 4.4 g/dL (09-29-20 @ 13:22)  Aspartate Aminotransferase (AST/SGOT): 54 U/L (09-29-20 @ 13:22)    Thyroid Stimulating Hormone, Serum: 2.19 uIU/mL (09-29-20 @ 16:50)    CAPILLARY BLOOD GLUCOSE      POCT Blood Glucose.: 285 mg/dL (30 Sep 2020 11:24)  POCT Blood Glucose.: 335 mg/dL (30 Sep 2020 05:51)  POCT Blood Glucose.: 473 mg/dL (30 Sep 2020 00:55)  POCT Blood Glucose.: 464 mg/dL (30 Sep 2020 00:54)  POCT Blood Glucose.: 287 mg/dL (29 Sep 2020 20:57)

## 2020-09-30 NOTE — ADVANCED PRACTICE NURSE CONSULT - ASSESSMENT
went to see pt in am pt is a+ox3 c/o 0 pain. pt states about 2 months ago he saw his pcp and was told he has boarder line diabetes, he could nt remember what number was. he has a strong family hx of diabetes. about a week ago he started frequency polyuria and plydipsia. by the end of las week he was vomiting. pt was educated about diabetes disease process and the increase risk of developing long term complications. pt verbalized understanding. pt was educated about the importance of using insulin @ this time and was educated about the 2 types of inuslin he will be going on their different action and schedule. pt verbalized understanding. also educated about the importance of bg monitoring and was advised to check bg 3-4xdaily parameters provided. pt has ef[address] cde in [place] on 10/2/2020 and with dr mariscal on 10/28/2020 in [place] as per pt request.

## 2020-09-30 NOTE — CONSULT NOTE ADULT - ASSESSMENT
T2DM  -A1c 10  -start lantus 30 units tonight  -would probably need daily insulin and orals  -could probably go on metformin at discharge  -seen by cde  -needs nutrition counseling    mixed HLD 25M with recent dx of "borderline diabetes," hyperlipidemia and obesity presents with1 week history of polyuria/polydipsia and 2 day history of worsening nausea, vomiting, abdominal pain. Reported intentional weight loss of 20 lbs due to changes in diet (stopped soda/bread). Was told be by PMD that he had borderline diabetes but was not started any type of meds for diabetes and only started on lipitor. In ED found to have new onset Diabetes and acidosis. Patient states he is feeling better since getting hydration in the ED and nausea and abdominal pain have since resolved and he wants to eat. A1c 10.7    T2DM likely  -A1c 10  -start lantus 30 units tonight  -would probably need daily insulin and orals  -could probably go on metformin at discharge but to be determined  -seen by cde  -needs nutrition counseling  -check cpeptide and CHASE to r/o type 1 diabetes  -educated on complications of diabetes    mixed HLD- TG high likely related to diabetes, avoid fatty foods/etoh. should improve with improvement with diabetes    obesity- educated on diet and exercise    discussed with attending

## 2020-10-01 ENCOUNTER — TRANSCRIPTION ENCOUNTER (OUTPATIENT)
Age: 25
End: 2020-10-01

## 2020-10-01 LAB
ALBUMIN SERPL ELPH-MCNC: 3.5 G/DL — SIGNIFICANT CHANGE UP (ref 3.3–5.2)
ALBUMIN SERPL ELPH-MCNC: 3.7 G/DL — SIGNIFICANT CHANGE UP (ref 3.3–5.2)
ALP SERPL-CCNC: 101 U/L — SIGNIFICANT CHANGE UP (ref 40–120)
ALP SERPL-CCNC: 99 U/L — SIGNIFICANT CHANGE UP (ref 40–120)
ALT FLD-CCNC: 96 U/L — HIGH
ALT FLD-CCNC: 96 U/L — HIGH
ANION GAP SERPL CALC-SCNC: 15 MMOL/L — SIGNIFICANT CHANGE UP (ref 5–17)
ANION GAP SERPL CALC-SCNC: 15 MMOL/L — SIGNIFICANT CHANGE UP (ref 5–17)
AST SERPL-CCNC: 82 U/L — HIGH
AST SERPL-CCNC: 86 U/L — HIGH
BILIRUB SERPL-MCNC: 0.5 MG/DL — SIGNIFICANT CHANGE UP (ref 0.4–2)
BILIRUB SERPL-MCNC: 0.6 MG/DL — SIGNIFICANT CHANGE UP (ref 0.4–2)
BUN SERPL-MCNC: 8 MG/DL — SIGNIFICANT CHANGE UP (ref 8–20)
BUN SERPL-MCNC: 8 MG/DL — SIGNIFICANT CHANGE UP (ref 8–20)
C PEPTIDE SERPL-MCNC: 2.4 NG/ML — SIGNIFICANT CHANGE UP (ref 1.1–4.4)
CALCIUM SERPL-MCNC: 8.9 MG/DL — SIGNIFICANT CHANGE UP (ref 8.6–10.2)
CALCIUM SERPL-MCNC: 8.9 MG/DL — SIGNIFICANT CHANGE UP (ref 8.6–10.2)
CHLORIDE SERPL-SCNC: 99 MMOL/L — SIGNIFICANT CHANGE UP (ref 98–107)
CHLORIDE SERPL-SCNC: 99 MMOL/L — SIGNIFICANT CHANGE UP (ref 98–107)
CO2 SERPL-SCNC: 21 MMOL/L — LOW (ref 22–29)
CO2 SERPL-SCNC: 23 MMOL/L — SIGNIFICANT CHANGE UP (ref 22–29)
CREAT SERPL-MCNC: 0.69 MG/DL — SIGNIFICANT CHANGE UP (ref 0.5–1.3)
CREAT SERPL-MCNC: 0.71 MG/DL — SIGNIFICANT CHANGE UP (ref 0.5–1.3)
GLUCOSE BLDC GLUCOMTR-MCNC: 254 MG/DL — HIGH (ref 70–99)
GLUCOSE BLDC GLUCOMTR-MCNC: 272 MG/DL — HIGH (ref 70–99)
GLUCOSE BLDC GLUCOMTR-MCNC: 309 MG/DL — HIGH (ref 70–99)
GLUCOSE BLDC GLUCOMTR-MCNC: 317 MG/DL — HIGH (ref 70–99)
GLUCOSE SERPL-MCNC: 300 MG/DL — HIGH (ref 70–99)
GLUCOSE SERPL-MCNC: 344 MG/DL — HIGH (ref 70–99)
MAGNESIUM SERPL-MCNC: 1.8 MG/DL — SIGNIFICANT CHANGE UP (ref 1.6–2.6)
PHOSPHATE SERPL-MCNC: 3.2 MG/DL — SIGNIFICANT CHANGE UP (ref 2.4–4.7)
POTASSIUM SERPL-MCNC: 3.7 MMOL/L — SIGNIFICANT CHANGE UP (ref 3.5–5.3)
POTASSIUM SERPL-MCNC: 3.8 MMOL/L — SIGNIFICANT CHANGE UP (ref 3.5–5.3)
POTASSIUM SERPL-SCNC: 3.7 MMOL/L — SIGNIFICANT CHANGE UP (ref 3.5–5.3)
POTASSIUM SERPL-SCNC: 3.8 MMOL/L — SIGNIFICANT CHANGE UP (ref 3.5–5.3)
PROT SERPL-MCNC: 6.7 G/DL — SIGNIFICANT CHANGE UP (ref 6.6–8.7)
PROT SERPL-MCNC: 6.8 G/DL — SIGNIFICANT CHANGE UP (ref 6.6–8.7)
SARS-COV-2 IGG SERPL QL IA: POSITIVE
SARS-COV-2 IGM SERPL IA-ACNC: 107 AU/ML — HIGH
SODIUM SERPL-SCNC: 134 MMOL/L — LOW (ref 135–145)
SODIUM SERPL-SCNC: 137 MMOL/L — SIGNIFICANT CHANGE UP (ref 135–145)

## 2020-10-01 PROCEDURE — 99232 SBSQ HOSP IP/OBS MODERATE 35: CPT

## 2020-10-01 PROCEDURE — 99233 SBSQ HOSP IP/OBS HIGH 50: CPT

## 2020-10-01 RX ORDER — INSULIN LISPRO 100/ML
10 VIAL (ML) SUBCUTANEOUS
Refills: 0 | Status: DISCONTINUED | OUTPATIENT
Start: 2020-10-01 | End: 2020-10-02

## 2020-10-01 RX ORDER — INSULIN GLARGINE 100 [IU]/ML
40 INJECTION, SOLUTION SUBCUTANEOUS AT BEDTIME
Refills: 0 | Status: DISCONTINUED | OUTPATIENT
Start: 2020-10-01 | End: 2020-10-02

## 2020-10-01 RX ADMIN — Medication 10 UNIT(S): at 17:08

## 2020-10-01 RX ADMIN — Medication 8: at 11:42

## 2020-10-01 RX ADMIN — ATORVASTATIN CALCIUM 20 MILLIGRAM(S): 80 TABLET, FILM COATED ORAL at 22:07

## 2020-10-01 RX ADMIN — Medication 8: at 08:23

## 2020-10-01 RX ADMIN — Medication 6: at 17:08

## 2020-10-01 RX ADMIN — Medication 1: at 21:26

## 2020-10-01 RX ADMIN — INSULIN GLARGINE 40 UNIT(S): 100 INJECTION, SOLUTION SUBCUTANEOUS at 22:07

## 2020-10-01 NOTE — PROGRESS NOTE ADULT - ASSESSMENT
25M with recent dx of "borderline diabetes," hyperlipidemia and obesity presents with1 week history of polyuria/polydipsia and 2 day history of worsening nausea, vomiting, abdominal pain. Reported intentional weight loss of 20 lbs due to changes in diet (stopped soda/bread). Was told be by PMD that he had borderline diabetes but was not started any type of meds for diabetes and only started on lipitor. In ED found to have new onset Diabetes and acidosis. Patient states he is feeling better since getting hydration in the ED and nausea and abdominal pain have since resolved and he wants to eat. A1c 10.7    T2DM likely  -A1c 10  -increase lantus to 40 units tonight  -start lispro 10 TID  -seen by cde  -pending cpeptide and CHASE to r/o type 1 diabetes  -educated on complications of diabetes  -if FS improving in 200s, okay to discharge tomorrow  -patient already has appointments: 10/16 2pm with cde and 10/28 2pm with me (both in [place] location)    mixed HLD- TG high likely related to diabetes, avoid fatty foods/etoh. should improve with improvement with diabetes    obesity- educated on diet and exercise    discussed with attending
New onset of DM2 with hyperglycemia and mild DKA  - A1C: 10  - DKA resolved, no AG, acidosis, now asymptomatic and tolerating food.  - Endocrinology consulted, recommended Lantus 30u qhs and Metformin on discharge.   - Seen by CDE    Obesity  - Counseled on lifestyle modifications  - Nutrition referral.     HLD  - C/w Lipitor 20mg daily    Prophylactic measure  -Low risk, Intermittent pneumatic compressions    Dispo/LOS anticipation: Likely discharge tomorrow, to follow up with Nutrition on 10/2/2020 and Dr. Aranda on 10/28/2020
New onset of DM2 with hyperglycemia and mild DKA  - A1C: 10  - DKA resolved, no AG, acidosis, now asymptomatic and tolerating food.  - Endocrinology consulted, recs appreciated  - Seen by CDE  - Glucose remains elevated, will increase Lantus to 40u and start Lispro 10u qd, will monitor sugars tonight as per Endocrinology    Obesity  - Counseled on lifestyle modifications  - seen by Nutrtion     HLD  - C/w Lipitor 20mg daily    Prophylactic measure  -Low risk, Intermittent pneumatic compressions    Dispo/LOS anticipation: Likely discharge tomorrow, to follow up with Nutrition and Dr. Aranda on 10/28/2020
New onset of DM2 with hyperglycemia and mild DKA  - A1C: 10  - DKA resolved, no AG, acidosis, now asymptomatic and tolerating food.  - Endocrinology consulted, recs appreciated  - Seen by CDE  - Glucose remains elevated, will increase Lantus to 40u and start Lispro 10u qd, will monitor sugars tonight as per Endocrinology    Obesity  - Counseled on lifestyle modifications  - seen by Nutrtion     HLD  - C/w Lipitor 20mg daily    Prophylactic measure  -Low risk, Intermittent pneumatic compressions    Dispo/LOS anticipation: Likely discharge tomorrow, to follow up with Nutrition and Dr. Aranda on 10/28/2020

## 2020-10-01 NOTE — DISCHARGE NOTE PROVIDER - CARE PROVIDER_API CALL
Siobhan Aranda  INTERNAL MEDICINE  [address] [id number]  Phone: [phone]  Fax: [phone]  Follow Up Time:

## 2020-10-01 NOTE — DISCHARGE NOTE PROVIDER - NSDCFUADDINST_GEN_ALL_CORE_FT
- Please take medications as reconciled.  - Please follow up with Nutrition clinic on 10/16/2020. Please follow up with Endocrinology clinic on 10/28. Both are located in [place].   - You are currently stable for discharge. If your medical condition worsens, please seek immediate medical care.

## 2020-10-01 NOTE — ADVANCED PRACTICE NURSE CONSULT - RECOMMEDATIONS
continue diabetes self management education  pls consider increase lantus to 40 units qhs  and humalog premeal increase to 10 units + moderate ss  pt to inject ll insulin doses while in the hospital using prefilled insulin syringe and rn supervision  glucometer teaching   insulin pen teaching

## 2020-10-01 NOTE — DIETITIAN INITIAL EVALUATION ADULT. - PERTINENT LABORATORY DATA
10-01 Na137 mmol/L Glu 300 mg/dL<H> K+ 3.8 mmol/L Cr  0.71 mg/dL BUN 8.0 mg/dL Phos 3.2 mg/dL Alb 3.7 g/dL PAB n/a

## 2020-10-01 NOTE — PROGRESS NOTE ADULT - SUBJECTIVE AND OBJECTIVE BOX
[facility] Division of Hospital Medicine  Tahir Bearden [phone]    Chief Complaint:  Patient is a 25y old  Male who presents with a chief complaint of New onset Diabetes (30 Sep 2020 16:18)      SUBJECTIVE / OVERNIGHT EVENTS:  Patient seen and examined at bedside. Reports resolution of symptoms. Doing well. No complaints.    Patient denies chest pain, SOB, abd pain, N/V, fever, chills, dysuria or any other complaints. All remainder ROS negative.     MEDICATIONS  (STANDING):  atorvastatin 20 milliGRAM(s) Oral at bedtime  dextrose 5%. 1000 milliLiter(s) (50 mL/Hr) IV Continuous <Continuous>  dextrose 50% Injectable 12.5 Gram(s) IV Push once  dextrose 50% Injectable 25 Gram(s) IV Push once  dextrose 50% Injectable 25 Gram(s) IV Push once  influenza   Vaccine 0.5 milliLiter(s) IntraMuscular once  insulin glargine Injectable (LANTUS) 30 Unit(s) SubCutaneous at bedtime  insulin lispro (HumaLOG) corrective regimen sliding scale   SubCutaneous three times a day before meals  insulin lispro (HumaLOG) corrective regimen sliding scale   SubCutaneous at bedtime  lactated ringers. 1000 milliLiter(s) (100 mL/Hr) IV Continuous <Continuous>    MEDICATIONS  (PRN):  dextrose 40% Gel 15 Gram(s) Oral once PRN Blood Glucose LESS THAN 70 milliGRAM(s)/deciliter  glucagon  Injectable 1 milliGRAM(s) IntraMuscular once PRN Glucose LESS THAN 70 milligrams/deciliter  ondansetron Injectable 4 milliGRAM(s) IV Push every 6 hours PRN Nausea and/or Vomiting        I&O's Summary    29 Sep 2020 07:01  -  30 Sep 2020 07:00  --------------------------------------------------------  IN: 800 mL / OUT: 0 mL / NET: 800 mL        PHYSICAL EXAM:  Vital Signs Last 24 Hrs  T(C): 37 (30 Sep 2020 15:35), Max: 37.2 (29 Sep 2020 20:24)  T(F): 98.6 (30 Sep 2020 15:35), Max: 98.9 (29 Sep 2020 20:24)  HR: 83 (30 Sep 2020 15:35) (70 - 94)  BP: 121/84 (30 Sep 2020 15:35) (111/63 - 145/77)  BP(mean): --  RR: 18 (30 Sep 2020 15:35) (18 - 20)  SpO2: 95% (30 Sep 2020 15:35) (95% - 99%)        CONSTITUTIONAL: Young obese male, laying comfortably in bed, NAD  HEENT: NC/AT, PERRL, no JVD  RESPIRATORY: CTA bilaterally, normal effort  CARDIOVASCULAR: RRR, S1/S2+, no m/g/r  ABDOMEN: Nontender to palpation, normoactive bowel sounds, no rebound/guarding; No hepatosplenomegaly  MUSCLOSKELETAL: No edema, cyanosis or deformities.  PSYCH: A+O to person, place, and time; affect appropriate  NEUROLOGY: CN 2-12 are intact and symmetric; no gross neurological deficits.  SKIN: No rashes; no palpable lesions  VASC: Distal pulses palpable    LABS:                        16.9   7.18  )-----------( 255      ( 29 Sep 2020 13:22 )             48.8     09-30    137  |  99  |  10.0  ----------------------------<  334<H>  3.9   |  23.0  |  0.78    Ca    8.7      30 Sep 2020 07:36    TPro  6.6  /  Alb  3.5  /  TBili  0.5  /  DBili  x   /  AST  111<H>  /  ALT  87<H>  /  AlkPhos  102  09-30    PT/INR - ( 29 Sep 2020 13:22 )   PT: 11.6 sec;   INR: 1.00 ratio         PTT - ( 29 Sep 2020 13:22 )  PTT:31.2 sec      Urinalysis Basic - ( 29 Sep 2020 13:27 )    Color: Yellow / Appearance: Clear / S.010 / pH: x  Gluc: x / Ketone: Moderate  / Bili: Negative / Urobili: Negative mg/dL   Blood: x / Protein: 15 mg/dL / Nitrite: Negative   Leuk Esterase: Negative / RBC: Negative /HPF / WBC 0-2   Sq Epi: x / Non Sq Epi: Occasional / Bacteria: Occasional        CAPILLARY BLOOD GLUCOSE      POCT Blood Glucose.: 296 mg/dL (30 Sep 2020 16:41)  POCT Blood Glucose.: 285 mg/dL (30 Sep 2020 11:24)  POCT Blood Glucose.: 335 mg/dL (30 Sep 2020 05:51)  POCT Blood Glucose.: 473 mg/dL (30 Sep 2020 00:55)  POCT Blood Glucose.: 464 mg/dL (30 Sep 2020 00:54)  POCT Blood Glucose.: 287 mg/dL (29 Sep 2020 20:57)        RADIOLOGY & ADDITIONAL TESTS:  Results Reviewed:   Imaging Personally Reviewed:  Electrocardiogram Personally Reviewed:                                          
[facility] Division of Hospital Medicine  Tahir Bearden [phone]    Chief Complaint:  Patient is a 25y old  Male who presents with a chief complaint of New onset Diabetes (30 Sep 2020 16:18)      SUBJECTIVE / OVERNIGHT EVENTS:  Patient seen and examined at bedside. Reports resolution of symptoms. Doing well. No complaints.    Patient denies chest pain, SOB, abd pain, N/V, fever, chills, dysuria or any other complaints. All remainder ROS negative.     MEDICATIONS  (STANDING):  atorvastatin 20 milliGRAM(s) Oral at bedtime  dextrose 5%. 1000 milliLiter(s) (50 mL/Hr) IV Continuous <Continuous>  dextrose 50% Injectable 12.5 Gram(s) IV Push once  dextrose 50% Injectable 25 Gram(s) IV Push once  dextrose 50% Injectable 25 Gram(s) IV Push once  influenza   Vaccine 0.5 milliLiter(s) IntraMuscular once  insulin glargine Injectable (LANTUS) 30 Unit(s) SubCutaneous at bedtime  insulin lispro (HumaLOG) corrective regimen sliding scale   SubCutaneous three times a day before meals  insulin lispro (HumaLOG) corrective regimen sliding scale   SubCutaneous at bedtime  lactated ringers. 1000 milliLiter(s) (100 mL/Hr) IV Continuous <Continuous>    MEDICATIONS  (PRN):  dextrose 40% Gel 15 Gram(s) Oral once PRN Blood Glucose LESS THAN 70 milliGRAM(s)/deciliter  glucagon  Injectable 1 milliGRAM(s) IntraMuscular once PRN Glucose LESS THAN 70 milligrams/deciliter  ondansetron Injectable 4 milliGRAM(s) IV Push every 6 hours PRN Nausea and/or Vomiting        I&O's Summary    29 Sep 2020 07:01  -  30 Sep 2020 07:00  --------------------------------------------------------  IN: 800 mL / OUT: 0 mL / NET: 800 mL        PHYSICAL EXAM:  Vital Signs Last 24 Hrs  T(C): 37 (30 Sep 2020 15:35), Max: 37.2 (29 Sep 2020 20:24)  T(F): 98.6 (30 Sep 2020 15:35), Max: 98.9 (29 Sep 2020 20:24)  HR: 83 (30 Sep 2020 15:35) (70 - 94)  BP: 121/84 (30 Sep 2020 15:35) (111/63 - 145/77)  BP(mean): --  RR: 18 (30 Sep 2020 15:35) (18 - 20)  SpO2: 95% (30 Sep 2020 15:35) (95% - 99%)        CONSTITUTIONAL: Young obese male, laying comfortably in bed, NAD  HEENT: NC/AT, PERRL, no JVD  RESPIRATORY: CTA bilaterally, normal effort  CARDIOVASCULAR: RRR, S1/S2+, no m/g/r  ABDOMEN: Nontender to palpation, normoactive bowel sounds, no rebound/guarding; No hepatosplenomegaly  MUSCLOSKELETAL: No edema, cyanosis or deformities.  PSYCH: A+O to person, place, and time; affect appropriate  NEUROLOGY: CN 2-12 are intact and symmetric; no gross neurological deficits.  SKIN: No rashes; no palpable lesions  VASC: Distal pulses palpable    LABS:                        16.9   7.18  )-----------( 255      ( 29 Sep 2020 13:22 )             48.8     09-30    137  |  99  |  10.0  ----------------------------<  334<H>  3.9   |  23.0  |  0.78    Ca    8.7      30 Sep 2020 07:36    TPro  6.6  /  Alb  3.5  /  TBili  0.5  /  DBili  x   /  AST  111<H>  /  ALT  87<H>  /  AlkPhos  102  09-30    PT/INR - ( 29 Sep 2020 13:22 )   PT: 11.6 sec;   INR: 1.00 ratio         PTT - ( 29 Sep 2020 13:22 )  PTT:31.2 sec      Urinalysis Basic - ( 29 Sep 2020 13:27 )    Color: Yellow / Appearance: Clear / S.010 / pH: x  Gluc: x / Ketone: Moderate  / Bili: Negative / Urobili: Negative mg/dL   Blood: x / Protein: 15 mg/dL / Nitrite: Negative   Leuk Esterase: Negative / RBC: Negative /HPF / WBC 0-2   Sq Epi: x / Non Sq Epi: Occasional / Bacteria: Occasional        CAPILLARY BLOOD GLUCOSE      POCT Blood Glucose.: 296 mg/dL (30 Sep 2020 16:41)  POCT Blood Glucose.: 285 mg/dL (30 Sep 2020 11:24)  POCT Blood Glucose.: 335 mg/dL (30 Sep 2020 05:51)  POCT Blood Glucose.: 473 mg/dL (30 Sep 2020 00:55)  POCT Blood Glucose.: 464 mg/dL (30 Sep 2020 00:54)  POCT Blood Glucose.: 287 mg/dL (29 Sep 2020 20:57)        RADIOLOGY & ADDITIONAL TESTS:  Results Reviewed:   Imaging Personally Reviewed:  Electrocardiogram Personally Reviewed:                                          
[facility] Division of Hospital Medicine  Tahir Bearden [phone]    Chief Complaint:  Patient is a 25y old  Male who presents with a chief complaint of New onset Diabetes (30 Sep 2020 16:18)      SUBJECTIVE / OVERNIGHT EVENTS:  Patient seen and examined at bedside. Reports resolution of symptoms. Doing well. No complaints.    Patient denies chest pain, SOB, abd pain, N/V, fever, chills, dysuria or any other complaints. All remainder ROS negative.     MEDICATIONS  (STANDING):  atorvastatin 20 milliGRAM(s) Oral at bedtime  dextrose 5%. 1000 milliLiter(s) (50 mL/Hr) IV Continuous <Continuous>  dextrose 50% Injectable 12.5 Gram(s) IV Push once  dextrose 50% Injectable 25 Gram(s) IV Push once  dextrose 50% Injectable 25 Gram(s) IV Push once  influenza   Vaccine 0.5 milliLiter(s) IntraMuscular once  insulin glargine Injectable (LANTUS) 30 Unit(s) SubCutaneous at bedtime  insulin lispro (HumaLOG) corrective regimen sliding scale   SubCutaneous three times a day before meals  insulin lispro (HumaLOG) corrective regimen sliding scale   SubCutaneous at bedtime  lactated ringers. 1000 milliLiter(s) (100 mL/Hr) IV Continuous <Continuous>    MEDICATIONS  (PRN):  dextrose 40% Gel 15 Gram(s) Oral once PRN Blood Glucose LESS THAN 70 milliGRAM(s)/deciliter  glucagon  Injectable 1 milliGRAM(s) IntraMuscular once PRN Glucose LESS THAN 70 milligrams/deciliter  ondansetron Injectable 4 milliGRAM(s) IV Push every 6 hours PRN Nausea and/or Vomiting        I&O's Summary    29 Sep 2020 07:01  -  30 Sep 2020 07:00  --------------------------------------------------------  IN: 800 mL / OUT: 0 mL / NET: 800 mL        PHYSICAL EXAM:  Vital Signs Last 24 Hrs  T(C): 37 (30 Sep 2020 15:35), Max: 37.2 (29 Sep 2020 20:24)  T(F): 98.6 (30 Sep 2020 15:35), Max: 98.9 (29 Sep 2020 20:24)  HR: 83 (30 Sep 2020 15:35) (70 - 94)  BP: 121/84 (30 Sep 2020 15:35) (111/63 - 145/77)  BP(mean): --  RR: 18 (30 Sep 2020 15:35) (18 - 20)  SpO2: 95% (30 Sep 2020 15:35) (95% - 99%)        CONSTITUTIONAL: Young obese male, laying comfortably in bed, NAD  HEENT: NC/AT, PERRL, no JVD  RESPIRATORY: CTA bilaterally, normal effort  CARDIOVASCULAR: RRR, S1/S2+, no m/g/r  ABDOMEN: Nontender to palpation, normoactive bowel sounds, no rebound/guarding; No hepatosplenomegaly  MUSCLOSKELETAL: No edema, cyanosis or deformities.  PSYCH: A+O to person, place, and time; affect appropriate  NEUROLOGY: CN 2-12 are intact and symmetric; no gross neurological deficits.  SKIN: No rashes; no palpable lesions  VASC: Distal pulses palpable    LABS:                        16.9   7.18  )-----------( 255      ( 29 Sep 2020 13:22 )             48.8     09-30    137  |  99  |  10.0  ----------------------------<  334<H>  3.9   |  23.0  |  0.78    Ca    8.7      30 Sep 2020 07:36    TPro  6.6  /  Alb  3.5  /  TBili  0.5  /  DBili  x   /  AST  111<H>  /  ALT  87<H>  /  AlkPhos  102  09-30    PT/INR - ( 29 Sep 2020 13:22 )   PT: 11.6 sec;   INR: 1.00 ratio         PTT - ( 29 Sep 2020 13:22 )  PTT:31.2 sec      Urinalysis Basic - ( 29 Sep 2020 13:27 )    Color: Yellow / Appearance: Clear / S.010 / pH: x  Gluc: x / Ketone: Moderate  / Bili: Negative / Urobili: Negative mg/dL   Blood: x / Protein: 15 mg/dL / Nitrite: Negative   Leuk Esterase: Negative / RBC: Negative /HPF / WBC 0-2   Sq Epi: x / Non Sq Epi: Occasional / Bacteria: Occasional        CAPILLARY BLOOD GLUCOSE      POCT Blood Glucose.: 296 mg/dL (30 Sep 2020 16:41)  POCT Blood Glucose.: 285 mg/dL (30 Sep 2020 11:24)  POCT Blood Glucose.: 335 mg/dL (30 Sep 2020 05:51)  POCT Blood Glucose.: 473 mg/dL (30 Sep 2020 00:55)  POCT Blood Glucose.: 464 mg/dL (30 Sep 2020 00:54)  POCT Blood Glucose.: 287 mg/dL (29 Sep 2020 20:57)        RADIOLOGY & ADDITIONAL TESTS:  Results Reviewed:   Imaging Personally Reviewed:  Electrocardiogram Personally Reviewed:                                          
Interval Events:  no overnight events  follow up on diabetes    patient seen and examined at bedside.  FS high  possible discharge tomorrow    REVIEW OF SYSTEMS:    CONSTITUTIONAL: No fever, weight loss, or fatigue  EYES: No eye pain, visual disturbances, or discharge  ENMT:  No difficulty hearing, tinnitus, vertigo; No sinus or throat pain  NECK: No pain or stiffness  RESPIRATORY: No cough, wheezing, chills or hemoptysis; No shortness of breath  CARDIOVASCULAR: No chest pain, palpitations, dizziness, or leg swelling  GASTROINTESTINAL: No abdominal or epigastric pain. No nausea, vomiting, or hematemesis; No diarrhea or constipation. No melena or hematochezia.  NEUROLOGICAL: No headaches, memory loss, loss of strength, numbness, or tremors  SKIN: No itching, burning, rashes, or lesions   MUSCULOSKELETAL: No joint pain or swelling; No muscle, back, or extremity pain  PSYCHIATRIC: No depression, anxiety, mood swings, or difficulty sleeping        No Known Allergies      MEDICATIONS  (STANDING):  atorvastatin 20 milliGRAM(s) Oral at bedtime  dextrose 5%. 1000 milliLiter(s) (50 mL/Hr) IV Continuous <Continuous>  dextrose 50% Injectable 12.5 Gram(s) IV Push once  dextrose 50% Injectable 25 Gram(s) IV Push once  dextrose 50% Injectable 25 Gram(s) IV Push once  influenza   Vaccine 0.5 milliLiter(s) IntraMuscular once  insulin glargine Injectable (LANTUS) 40 Unit(s) SubCutaneous at bedtime  insulin lispro (HumaLOG) corrective regimen sliding scale   SubCutaneous three times a day before meals  insulin lispro (HumaLOG) corrective regimen sliding scale   SubCutaneous at bedtime  insulin lispro Injectable (HumaLOG) 10 Unit(s) SubCutaneous three times a day before meals  lactated ringers. 1000 milliLiter(s) (100 mL/Hr) IV Continuous <Continuous>    MEDICATIONS  (PRN):  dextrose 40% Gel 15 Gram(s) Oral once PRN Blood Glucose LESS THAN 70 milliGRAM(s)/deciliter  glucagon  Injectable 1 milliGRAM(s) IntraMuscular once PRN Glucose LESS THAN 70 milligrams/deciliter  ondansetron Injectable 4 milliGRAM(s) IV Push every 6 hours PRN Nausea and/or Vomiting      Vital Signs Last 24 Hrs  T(C): 36.8 (01 Oct 2020 16:23), Max: 36.8 (30 Sep 2020 20:12)  T(F): 98.2 (01 Oct 2020 16:23), Max: 98.3 (01 Oct 2020 07:50)  HR: 86 (01 Oct 2020 16:23) (79 - 86)  BP: 120/78 (01 Oct 2020 16:23) (110/68 - 138/85)  BP(mean): --  RR: 20 (01 Oct 2020 16:23) (20 - 25)  SpO2: 96% (01 Oct 2020 16:23) (94% - 96%)    Physical Exam:    Constitutional: NAD, well-developed, obese  HEENT: EOMI, no exophalmos  Neck: trachea midline, no thyroid enlargement  Respiratory: CTAB, normal respirations  Cardiovascular: S1 and S2, RRR  Gastrointestinal: BS+, soft, ntnd  Extremities: No peripheral edema  Neurological: AOx3, no focal deficits  Psychiatric: Normal mood and normal affect  Skin: no rashes, no acanthosis    LABS  10-01    134<L>  |  99  |  8.0  ----------------------------<  344<H>  3.7   |  21.0<L>  |  0.69    Ca    8.9      01 Oct 2020 11:56  Phos  3.2     10-01  Mg     1.8     10-01    TPro  6.8  /  Alb  3.5  /  TBili  0.5  /  DBili  x   /  AST  86<H>  /  ALT  96<H>  /  AlkPhos  99  10-01      HDL Cholesterol, Serum: 22 mg/dL (09-30-20 @ 07:36)  Cholesterol, Serum: 224 mg/dL (09-30-20 @ 07:36)  Triglycerides, Serum: 573 mg/dL (09-30-20 @ 07:36)    Thyroid Stimulating Hormone, Serum: 2.19 uIU/mL (09-29-20 @ 16:50)    Alkaline Phosphatase, Serum: 99 U/L (10-01-20 @ 11:56)  Alanine Aminotransferase (ALT/SGPT): 96 U/L (10-01-20 @ 11:56)  Aspartate Aminotransferase (AST/SGOT): 86 U/L (10-01-20 @ 11:56)  Albumin, Serum: 3.5 g/dL (10-01-20 @ 11:56)  Alanine Aminotransferase (ALT/SGPT): 96 U/L (10-01-20 @ 08:55)  Albumin, Serum: 3.7 g/dL (10-01-20 @ 08:55)  Aspartate Aminotransferase (AST/SGOT): 82 U/L (10-01-20 @ 08:55)  Alkaline Phosphatase, Serum: 101 U/L (10-01-20 @ 08:55)  Alkaline Phosphatase, Serum: 102 U/L (09-30-20 @ 07:36)  Aspartate Aminotransferase (AST/SGOT): 111 U/L (09-30-20 @ 07:36)  Alanine Aminotransferase (ALT/SGPT): 87 U/L (09-30-20 @ 07:36)  Albumin, Serum: 3.5 g/dL (09-30-20 @ 07:36)      CAPILLARY BLOOD GLUCOSE      POCT Blood Glucose.: 254 mg/dL (01 Oct 2020 17:07)  POCT Blood Glucose.: 317 mg/dL (01 Oct 2020 11:41)  POCT Blood Glucose.: 309 mg/dL (01 Oct 2020 08:21)  POCT Blood Glucose.: 321 mg/dL (30 Sep 2020 21:30)

## 2020-10-01 NOTE — ADVANCED PRACTICE NURSE CONSULT - ASSESSMENT
went to see pt in am, pt states he is giving himself insulin injections and  tests bg. pt was made aware that bg is still elevated therefore long acting insulin will be increased and pre meal will be added. pt verbalized understanding. pt was encouraged to ambulate after meals in order to improve glycemic control. review w pt his appointment w archie tobias oct 2, 2020 @ 2 pm and 10/28/ 2020 @ 2 pm w dr ott in [place].

## 2020-10-01 NOTE — DIETITIAN INITIAL EVALUATION ADULT. - OTHER INFO
26 yo New onset of DM2 with hyperglycemia and mild DKA. Pt was educated on diet with meal planning with plate model and encouraged to see out pt RD for further education. Pt with good understanding and compliance seems hopeful. Pt intentionally trying to lose weight and has lost 20# recently. A1C noted 10. High Triglycerides as well.

## 2020-10-01 NOTE — DISCHARGE NOTE PROVIDER - NSDCFUSCHEDAPPT_GEN_ALL_CORE_FT
ESTHER HERNANDEZ ; 10/16/2020 ; [facility] [address] ESTHER Tovar ; 10/28/2020 ; [facility] [address]

## 2020-10-01 NOTE — DISCHARGE NOTE PROVIDER - NSDCMRMEDTOKEN_GEN_ALL_CORE_FT
atorvastatin: orally once a day   alcohol swabs : Apply topically to affected area 4 times a day   atorvastatin 20 mg oral tablet: 1 tab(s) orally once a day (at bedtime)  dme: Glucometer. E.11  Insulin Pen Needles, 4mm: 1 application subcutaneously 4 times a day. ** Use with insulin pen **   lancets: 1 application subcutaneously 4 times a day   Lantus Solostar Pen 100 units/mL subcutaneous solution: 40 unit(s) subcutaneous once a day (at bedtime)   NovoLOG FlexPen 100 units/mL injectable solution: 10 unit(s) subcutaneous 3 times a day (with meals)

## 2020-10-01 NOTE — DISCHARGE NOTE PROVIDER - NSDCCPCAREPLAN_GEN_ALL_CORE_FT
PRINCIPAL DISCHARGE DIAGNOSIS  Diagnosis: DKA (diabetic ketoacidoses)  Assessment and Plan of Treatment:       SECONDARY DISCHARGE DIAGNOSES  Diagnosis: Obesity  Assessment and Plan of Treatment:     Diagnosis: Hyperglycemia  Assessment and Plan of Treatment:      PRINCIPAL DISCHARGE DIAGNOSIS  Diagnosis: DKA (diabetic ketoacidoses)  Assessment and Plan of Treatment: - Please take medications as reconciled. Please follow up with your PMD within 2 weeks. Please follow up with Nutrition clinic on 10/16/2020. Please follow up with Endocrinology clinic on 10/28. Both are located in [place].      SECONDARY DISCHARGE DIAGNOSES  Diagnosis: Obesity  Assessment and Plan of Treatment:     Diagnosis: Hyperglycemia  Assessment and Plan of Treatment:

## 2020-10-01 NOTE — DISCHARGE NOTE PROVIDER - HOSPITAL COURSE
Patient is a 26 yo M w/ PMH of Obesity, HLD presenting with symptoms of polyuria, polydipsia, n/v/abd pain. He was found to have AGMA acidosis with ketones in the urine. Patient provided IVFs and sq insulin. Found to have new onset DM2. Endocrinology was consulted. Patient started on Lantus 40u ohs, Lispro 10u TID. Patient’s acidosis resolved. He was seen by diabetes education, and given insulin teaching. He is currently asymptomatic. He is currently stable for discharge and will follow up with Nutrition and Endocrinology outpatient.    Discharge time spent coordinatin minutes    Vital Signs Last 24 Hrs  T(F): 98.2 (01 Oct 2020 16:23), Max: 98.3 (01 Oct 2020 07:50)  HR: 86 (01 Oct 2020 16:23) (79 - 86)  BP: 120/78 (01 Oct 2020 16:23) (110/68 - 138/85)  RR: 20 (01 Oct 2020 16:23) (20 - 25)  SpO2: 96% (01 Oct 2020 16:23) (94% - 96%)    Physical Exam:  Constitutional: Appears stated aged, not- chronically ill-appearing, laying comfortably in bed in NAD  HEENT: NC/AT, PERRL, EOMI, trachea midline, no JVD  Respiratory: CTA bilaterally, symmetrical chest rise  Cardiovascular: RRR, no m/g/r  Gastrointestinal: Soft, NT/ND, BS+  Vascular: 2+ peripheral pulses  Neurological: A/O x 3, no focal neurological deficits  Psych: Fair mood/affect  Musculoskeletal: No edema, cyanosis, deformities. ROM normal  Skin: No obvious rash, lesions. No jaundice.

## 2020-10-02 ENCOUNTER — TRANSCRIPTION ENCOUNTER (OUTPATIENT)
Age: 25
End: 2020-10-02

## 2020-10-02 VITALS
HEART RATE: 90 BPM | DIASTOLIC BLOOD PRESSURE: 86 MMHG | OXYGEN SATURATION: 98 % | TEMPERATURE: 99 F | SYSTOLIC BLOOD PRESSURE: 140 MMHG | RESPIRATION RATE: 18 BRPM

## 2020-10-02 PROCEDURE — 99239 HOSP IP/OBS DSCHRG MGMT >30: CPT

## 2020-10-02 PROCEDURE — 86900 BLOOD TYPING SEROLOGIC ABO: CPT

## 2020-10-02 PROCEDURE — 86769 SARS-COV-2 COVID-19 ANTIBODY: CPT

## 2020-10-02 PROCEDURE — 84295 ASSAY OF SERUM SODIUM: CPT

## 2020-10-02 PROCEDURE — 80053 COMPREHEN METABOLIC PANEL: CPT

## 2020-10-02 PROCEDURE — 83036 HEMOGLOBIN GLYCOSYLATED A1C: CPT

## 2020-10-02 PROCEDURE — 86901 BLOOD TYPING SEROLOGIC RH(D): CPT

## 2020-10-02 PROCEDURE — 82803 BLOOD GASES ANY COMBINATION: CPT

## 2020-10-02 PROCEDURE — 80061 LIPID PANEL: CPT

## 2020-10-02 PROCEDURE — 82330 ASSAY OF CALCIUM: CPT

## 2020-10-02 PROCEDURE — 83605 ASSAY OF LACTIC ACID: CPT

## 2020-10-02 PROCEDURE — 84132 ASSAY OF SERUM POTASSIUM: CPT

## 2020-10-02 PROCEDURE — 82947 ASSAY GLUCOSE BLOOD QUANT: CPT

## 2020-10-02 PROCEDURE — 85025 COMPLETE CBC W/AUTO DIFF WBC: CPT

## 2020-10-02 PROCEDURE — 87086 URINE CULTURE/COLONY COUNT: CPT

## 2020-10-02 PROCEDURE — 96375 TX/PRO/DX INJ NEW DRUG ADDON: CPT

## 2020-10-02 PROCEDURE — 85730 THROMBOPLASTIN TIME PARTIAL: CPT

## 2020-10-02 PROCEDURE — 85610 PROTHROMBIN TIME: CPT

## 2020-10-02 PROCEDURE — 85018 HEMOGLOBIN: CPT

## 2020-10-02 PROCEDURE — 83735 ASSAY OF MAGNESIUM: CPT

## 2020-10-02 PROCEDURE — 84443 ASSAY THYROID STIM HORMONE: CPT

## 2020-10-02 PROCEDURE — 96374 THER/PROPH/DIAG INJ IV PUSH: CPT

## 2020-10-02 PROCEDURE — 80048 BASIC METABOLIC PNL TOTAL CA: CPT

## 2020-10-02 PROCEDURE — 86850 RBC ANTIBODY SCREEN: CPT

## 2020-10-02 PROCEDURE — 84100 ASSAY OF PHOSPHORUS: CPT

## 2020-10-02 PROCEDURE — 82010 KETONE BODYS QUAN: CPT

## 2020-10-02 PROCEDURE — 85014 HEMATOCRIT: CPT

## 2020-10-02 PROCEDURE — 82435 ASSAY OF BLOOD CHLORIDE: CPT

## 2020-10-02 PROCEDURE — U0003: CPT

## 2020-10-02 PROCEDURE — 84681 ASSAY OF C-PEPTIDE: CPT

## 2020-10-02 PROCEDURE — 81001 URINALYSIS AUTO W/SCOPE: CPT

## 2020-10-02 PROCEDURE — 82962 GLUCOSE BLOOD TEST: CPT

## 2020-10-02 PROCEDURE — 83690 ASSAY OF LIPASE: CPT

## 2020-10-02 PROCEDURE — 99285 EMERGENCY DEPT VISIT HI MDM: CPT | Mod: 25

## 2020-10-02 PROCEDURE — 86341 ISLET CELL ANTIBODY: CPT

## 2020-10-02 PROCEDURE — 36415 COLL VENOUS BLD VENIPUNCTURE: CPT

## 2020-10-02 RX ORDER — ISOPROPYL ALCOHOL, BENZOCAINE .7; .06 ML/ML; ML/ML
1 SWAB TOPICAL
Qty: 100 | Refills: 1
Start: 2020-10-02 | End: 2020-11-20

## 2020-10-02 RX ORDER — INSULIN ASPART 100 [IU]/ML
10 INJECTION, SOLUTION SUBCUTANEOUS
Qty: 5 | Refills: 0
Start: 2020-10-02 | End: 2020-10-31

## 2020-10-02 RX ORDER — ATORVASTATIN CALCIUM 80 MG/1
1 TABLET, FILM COATED ORAL
Qty: 30 | Refills: 0
Start: 2020-10-02 | End: 2020-10-31

## 2020-10-02 RX ORDER — ATORVASTATIN CALCIUM 80 MG/1
0 TABLET, FILM COATED ORAL
Qty: 0 | Refills: 0 | DISCHARGE

## 2020-10-02 RX ORDER — ENOXAPARIN SODIUM 100 MG/ML
40 INJECTION SUBCUTANEOUS
Qty: 1200 | Refills: 0
Start: 2020-10-02 | End: 2020-10-31

## 2020-10-02 RX ADMIN — Medication 6: at 08:34

## 2020-10-02 RX ADMIN — Medication 10 UNIT(S): at 08:34

## 2020-10-02 NOTE — DISCHARGE NOTE NURSING/CASE MANAGEMENT/SOCIAL WORK - PATIENT PORTAL LINK FT
You can access the FollowMyHealth Patient Portal offered by [facility] by registering at the following website: [email or web address]. By joining [employer]’s FollowMyHealth portal, you will also be able to view your health information using other applications (apps) compatible with our system.

## 2020-10-06 LAB — GAD65 AB SER-MCNC: 0 NMOL/L — SIGNIFICANT CHANGE UP

## 2020-10-23 ENCOUNTER — APPOINTMENT (OUTPATIENT)
Dept: ENDOCRINOLOGY | Facility: CLINIC | Age: 25
End: 2020-10-23
Payer: COMMERCIAL

## 2020-10-23 VITALS — WEIGHT: 315 LBS | BODY MASS INDEX: 53.78 KG/M2 | HEIGHT: 64 IN

## 2020-10-23 DIAGNOSIS — E11.65 TYPE 2 DIABETES MELLITUS WITH HYPERGLYCEMIA: ICD-10-CM

## 2020-10-23 DIAGNOSIS — E78.5 HYPERLIPIDEMIA, UNSPECIFIED: ICD-10-CM

## 2020-10-23 DIAGNOSIS — E66.9 OBESITY, UNSPECIFIED: ICD-10-CM

## 2020-10-23 LAB — HBA1C MFR BLD HPLC: 10

## 2020-10-23 PROCEDURE — G0108 DIAB MANAGE TRN  PER INDIV: CPT

## 2020-10-23 PROCEDURE — 99072 ADDL SUPL MATRL&STAF TM PHE: CPT

## 2020-10-27 ENCOUNTER — NON-APPOINTMENT (OUTPATIENT)
Age: 25
End: 2020-10-27

## 2020-10-27 RX ORDER — LANCETS
EACH MISCELLANEOUS
Qty: 4 | Refills: 0 | Status: ACTIVE | COMMUNITY
Start: 2020-10-27 | End: 1900-01-01

## 2020-10-27 RX ORDER — BLOOD SUGAR DIAGNOSTIC
STRIP MISCELLANEOUS 4 TIMES DAILY
Qty: 4 | Refills: 0 | Status: ACTIVE | COMMUNITY
Start: 2020-10-27 | End: 1900-01-01

## 2020-10-28 ENCOUNTER — APPOINTMENT (OUTPATIENT)
Dept: ENDOCRINOLOGY | Facility: CLINIC | Age: 25
End: 2020-10-28

## 2020-11-27 ENCOUNTER — APPOINTMENT (OUTPATIENT)
Dept: ENDOCRINOLOGY | Facility: CLINIC | Age: 25
End: 2020-11-27

## 2021-11-10 ENCOUNTER — EMERGENCY (EMERGENCY)
Facility: HOSPITAL | Age: 26
LOS: 1 days | Discharge: HOME | End: 2021-11-10
Attending: EMERGENCY MEDICINE
Payer: COMMERCIAL

## 2021-11-10 VITALS
HEART RATE: 121 BPM | DIASTOLIC BLOOD PRESSURE: 78 MMHG | SYSTOLIC BLOOD PRESSURE: 139 MMHG | TEMPERATURE: 101 F | OXYGEN SATURATION: 97 % | HEIGHT: 64 IN | RESPIRATION RATE: 16 BRPM

## 2021-11-10 DIAGNOSIS — Z90.49 ACQUIRED ABSENCE OF OTHER SPECIFIED PARTS OF DIGESTIVE TRACT: Chronic | ICD-10-CM

## 2021-11-10 PROCEDURE — 99284 EMERGENCY DEPT VISIT MOD MDM: CPT

## 2021-11-10 NOTE — ED ADULT TRIAGE NOTE - CHIEF COMPLAINT QUOTE
Pt. complaining of toothache to left side for a few days. Pt. states he has been getting fevers. Pt. has no seen a dentist and has not taken Tylenol or Motrin for fevers.

## 2021-11-11 VITALS — HEART RATE: 80 BPM | TEMPERATURE: 99 F

## 2021-11-11 LAB
ALBUMIN SERPL ELPH-MCNC: 4 G/DL — SIGNIFICANT CHANGE UP (ref 3.3–5.2)
ALP SERPL-CCNC: 101 U/L — SIGNIFICANT CHANGE UP (ref 40–120)
ALT FLD-CCNC: 31 U/L — SIGNIFICANT CHANGE UP
ANION GAP SERPL CALC-SCNC: 16 MMOL/L — SIGNIFICANT CHANGE UP (ref 5–17)
AST SERPL-CCNC: 26 U/L — SIGNIFICANT CHANGE UP
BASOPHILS # BLD AUTO: 0.02 K/UL — SIGNIFICANT CHANGE UP (ref 0–0.2)
BASOPHILS NFR BLD AUTO: 0.4 % — SIGNIFICANT CHANGE UP (ref 0–2)
BILIRUB SERPL-MCNC: 0.5 MG/DL — SIGNIFICANT CHANGE UP (ref 0.4–2)
BUN SERPL-MCNC: 7.5 MG/DL — LOW (ref 8–20)
CALCIUM SERPL-MCNC: 8.5 MG/DL — LOW (ref 8.6–10.2)
CHLORIDE SERPL-SCNC: 99 MMOL/L — SIGNIFICANT CHANGE UP (ref 98–107)
CO2 SERPL-SCNC: 23 MMOL/L — SIGNIFICANT CHANGE UP (ref 22–29)
CREAT SERPL-MCNC: 0.74 MG/DL — SIGNIFICANT CHANGE UP (ref 0.5–1.3)
EOSINOPHIL # BLD AUTO: 0.01 K/UL — SIGNIFICANT CHANGE UP (ref 0–0.5)
EOSINOPHIL NFR BLD AUTO: 0.2 % — SIGNIFICANT CHANGE UP (ref 0–6)
GLUCOSE SERPL-MCNC: 333 MG/DL — HIGH (ref 70–99)
HCT VFR BLD CALC: 42.9 % — SIGNIFICANT CHANGE UP (ref 39–50)
HGB BLD-MCNC: 15.3 G/DL — SIGNIFICANT CHANGE UP (ref 13–17)
IMM GRANULOCYTES NFR BLD AUTO: 0.4 % — SIGNIFICANT CHANGE UP (ref 0–1.5)
LYMPHOCYTES # BLD AUTO: 0.94 K/UL — LOW (ref 1–3.3)
LYMPHOCYTES # BLD AUTO: 18.4 % — SIGNIFICANT CHANGE UP (ref 13–44)
MCHC RBC-ENTMCNC: 29.5 PG — SIGNIFICANT CHANGE UP (ref 27–34)
MCHC RBC-ENTMCNC: 35.7 GM/DL — SIGNIFICANT CHANGE UP (ref 32–36)
MCV RBC AUTO: 82.7 FL — SIGNIFICANT CHANGE UP (ref 80–100)
MONOCYTES # BLD AUTO: 0.43 K/UL — SIGNIFICANT CHANGE UP (ref 0–0.9)
MONOCYTES NFR BLD AUTO: 8.4 % — SIGNIFICANT CHANGE UP (ref 2–14)
NEUTROPHILS # BLD AUTO: 3.69 K/UL — SIGNIFICANT CHANGE UP (ref 1.8–7.4)
NEUTROPHILS NFR BLD AUTO: 72.2 % — SIGNIFICANT CHANGE UP (ref 43–77)
PLATELET # BLD AUTO: 197 K/UL — SIGNIFICANT CHANGE UP (ref 150–400)
POTASSIUM SERPL-MCNC: 3.7 MMOL/L — SIGNIFICANT CHANGE UP (ref 3.5–5.3)
POTASSIUM SERPL-SCNC: 3.7 MMOL/L — SIGNIFICANT CHANGE UP (ref 3.5–5.3)
PROT SERPL-MCNC: 6.9 G/DL — SIGNIFICANT CHANGE UP (ref 6.6–8.7)
RBC # BLD: 5.19 M/UL — SIGNIFICANT CHANGE UP (ref 4.2–5.8)
RBC # FLD: 12.2 % — SIGNIFICANT CHANGE UP (ref 10.3–14.5)
SODIUM SERPL-SCNC: 138 MMOL/L — SIGNIFICANT CHANGE UP (ref 135–145)
WBC # BLD: 5.11 K/UL — SIGNIFICANT CHANGE UP (ref 3.8–10.5)
WBC # FLD AUTO: 5.11 K/UL — SIGNIFICANT CHANGE UP (ref 3.8–10.5)

## 2021-11-11 PROCEDURE — 36415 COLL VENOUS BLD VENIPUNCTURE: CPT

## 2021-11-11 PROCEDURE — 96374 THER/PROPH/DIAG INJ IV PUSH: CPT

## 2021-11-11 PROCEDURE — 85025 COMPLETE CBC W/AUTO DIFF WBC: CPT

## 2021-11-11 PROCEDURE — 70491 CT SOFT TISSUE NECK W/DYE: CPT | Mod: MA

## 2021-11-11 PROCEDURE — 70491 CT SOFT TISSUE NECK W/DYE: CPT | Mod: 26,MA

## 2021-11-11 PROCEDURE — 99284 EMERGENCY DEPT VISIT MOD MDM: CPT | Mod: 25

## 2021-11-11 PROCEDURE — 80053 COMPREHEN METABOLIC PANEL: CPT

## 2021-11-11 RX ORDER — PENICILLIN V POTASSIUM 250 MG
500 TABLET ORAL ONCE
Refills: 0 | Status: DISCONTINUED | OUTPATIENT
Start: 2021-11-11 | End: 2021-11-11

## 2021-11-11 RX ORDER — IBUPROFEN 200 MG
600 TABLET ORAL ONCE
Refills: 0 | Status: COMPLETED | OUTPATIENT
Start: 2021-11-11 | End: 2021-11-11

## 2021-11-11 RX ORDER — AMPICILLIN SODIUM AND SULBACTAM SODIUM 250; 125 MG/ML; MG/ML
3 INJECTION, POWDER, FOR SUSPENSION INTRAMUSCULAR; INTRAVENOUS ONCE
Refills: 0 | Status: COMPLETED | OUTPATIENT
Start: 2021-11-11 | End: 2021-11-11

## 2021-11-11 RX ORDER — ACETAMINOPHEN 500 MG
975 TABLET ORAL ONCE
Refills: 0 | Status: COMPLETED | OUTPATIENT
Start: 2021-11-11 | End: 2021-11-11

## 2021-11-11 RX ADMIN — Medication 600 MILLIGRAM(S): at 01:00

## 2021-11-11 RX ADMIN — Medication 975 MILLIGRAM(S): at 01:01

## 2021-11-11 RX ADMIN — AMPICILLIN SODIUM AND SULBACTAM SODIUM 200 GRAM(S): 250; 125 INJECTION, POWDER, FOR SUSPENSION INTRAMUSCULAR; INTRAVENOUS at 01:17

## 2021-11-11 NOTE — ED PROVIDER NOTE - CLINICAL SUMMARY MEDICAL DECISION MAKING FREE TEXT BOX
Pt with 4 days of dental pain, given fever and HA will get CT scan of neck and check basic labs. 1 dose of Unasyn

## 2021-11-11 NOTE — ED PROVIDER NOTE - PATIENT PORTAL LINK FT
You can access the FollowMyHealth Patient Portal offered by [facility] by registering at the following website: [email or web address]. By joining [employer]’s FollowMyHealth portal, you will also be able to view your health information using other applications (apps) compatible with our system.

## 2021-11-11 NOTE — ED PROVIDER NOTE - OBJECTIVE STATEMENT
26 year old male with no PMHx presents to the ED for toothache. Reports started 4 days ago and has been getting worse. Also reports fever and HA. Denies neck pain, vomiting, drooling, SOB. Pt does not been able to see a dentist for the sx. Has not taken anything prior to arrival for fever.

## 2021-11-11 NOTE — ED PROVIDER NOTE - NSFOLLOWUPINSTRUCTIONS_ED_ALL_ED_FT
Follow up with dentist within 1-2 days   Take Antibiotic as prescribed    [facility]     [phone]    [employer] [place] [address]        Toothache    WHAT YOU NEED TO KNOW:    What is a toothache and what causes it? A toothache is pain that is caused by irritation of the nerves in the center of your tooth. The irritation may be caused by several problems, such as a cavity, an infection, a cracked tooth, or gum disease.    Tooth Anatomy         How is a toothache diagnosed? Your healthcare provider will ask how long you have had a toothache. He or she will also ask if you have any other symptoms or medical conditions. Your healthcare provider will examine your tooth and mouth. Your provider may also examine your face and neck. You may need x-rays to check for an infection or cracked tooth.     How is a toothache treated? Treatment depends on the cause of your toothache. You may need any of the following:   •NSAIDs, such as ibuprofen, help decrease swelling, pain, and fever. This medicine is available with or without a doctor's order. NSAIDs can cause stomach bleeding or kidney problems in certain people. If you take blood thinner medicine, always ask if NSAIDs are safe for you. Always read the medicine label and follow directions. Do not give these medicines to children under 6 months of age without direction from your child's healthcare provider.      •Acetaminophen decreases pain and fever. It is available without a doctor's order. Ask how much to take and how often to take it. Follow directions. Acetaminophen can cause liver damage if not taken correctly.      •Prescription pain medicine may be given. Ask your healthcare provider how to take this medicine safely. Some prescription pain medicines contain acetaminophen. Do not take other medicines that contain acetaminophen without talking to your healthcare provider. Too much acetaminophen may cause liver damage. Prescription pain medicine may cause constipation. Ask your healthcare provider how to prevent or treat constipation.       •Antibiotics help treat or prevent a bacterial infection.       How can I manage my toothache?   •Rinse your mouth with warm salt water 4 times a day or as directed.       •Eat soft foods to help relieve pain caused by chewing.       •Apply ice on your jaw or cheek for 15 to 20 minutes every hour or as directed. Use an ice pack, or put crushed ice in a plastic bag. Cover it with a towel before you apply it. Ice helps prevent tissue damage and decreases swelling and pain.      How can I help prevent a toothache?   •Brush your teeth at least 2 times a day.      •Use dental floss to clean between your teeth at least 1 time a day.      •See your dentist regularly every 6 months for dental cleanings and oral exams.      When should I seek immediate care?   •You have trouble breathing or swallowing.       •You have swelling in your face or neck.       When should I contact my dentist?   •You have a fever and chills.       •You have trouble opening or closing your mouth.       •You have swelling around your tooth.       •You have questions or concerns about your condition or care.

## 2021-11-11 NOTE — ED PROVIDER NOTE - ATTENDING CONTRIBUTION TO CARE
4 days of dental pain no obvious abscess, now with fever as well, CT performed to r/o deep space neck infection. No other focal source of fever readily identified, labs with no actionable findings, mild lymphocytopenia possible viral suppression. Supportive care, dc with return precautions.

## 2021-11-11 NOTE — ED PROVIDER NOTE - ENMT, MLM
Airway patent, R upper posterior most molar with surrounding erythema, no abscess, no cracking of teeth, no TTP underneath tongue, tolerating secretions

## 2021-11-12 ENCOUNTER — EMERGENCY (EMERGENCY)
Facility: HOSPITAL | Age: 26
LOS: 1 days | Discharge: LEFT BEFORE DISCHARGE | End: 2021-11-12
Payer: COMMERCIAL

## 2021-11-12 DIAGNOSIS — Z90.49 ACQUIRED ABSENCE OF OTHER SPECIFIED PARTS OF DIGESTIVE TRACT: Chronic | ICD-10-CM

## 2021-11-12 PROCEDURE — L9991: CPT
